# Patient Record
Sex: MALE | Race: WHITE | NOT HISPANIC OR LATINO | Employment: OTHER | URBAN - METROPOLITAN AREA
[De-identification: names, ages, dates, MRNs, and addresses within clinical notes are randomized per-mention and may not be internally consistent; named-entity substitution may affect disease eponyms.]

---

## 2023-01-17 PROBLEM — M17.9 OSTEOARTHRITIS OF KNEE: Status: ACTIVE | Noted: 2023-01-17

## 2023-01-17 PROBLEM — R10.2 SUPRAPUBIC PAIN: Status: ACTIVE | Noted: 2023-01-17

## 2023-01-17 PROBLEM — H69.90 EUSTACHIAN TUBE DISORDER: Status: ACTIVE | Noted: 2023-01-17

## 2023-01-17 PROBLEM — G25.0 ESSENTIAL TREMOR: Status: ACTIVE | Noted: 2023-01-17

## 2023-01-17 PROBLEM — I43 CARDIAC AMYLOIDOSIS (MULTI): Status: ACTIVE | Noted: 2023-01-17

## 2023-01-17 PROBLEM — M62.830 LUMBAR PARASPINAL MUSCLE SPASM: Status: ACTIVE | Noted: 2023-01-17

## 2023-01-17 PROBLEM — C44.01 BASAL CELL CARCINOMA OF UPPER LIP: Status: ACTIVE | Noted: 2023-01-17

## 2023-01-17 PROBLEM — Z12.11 COLON CANCER SCREENING: Status: RESOLVED | Noted: 2023-01-17 | Resolved: 2023-01-17

## 2023-01-17 PROBLEM — Z12.11 COLON CANCER SCREENING: Status: ACTIVE | Noted: 2023-01-17

## 2023-01-17 PROBLEM — R97.20 BPH WITH ELEVATED PSA: Status: ACTIVE | Noted: 2023-01-17

## 2023-01-17 PROBLEM — K59.00 CONSTIPATION: Status: ACTIVE | Noted: 2023-01-17

## 2023-01-17 PROBLEM — R94.4 DECREASED GFR: Status: ACTIVE | Noted: 2023-01-17

## 2023-01-17 PROBLEM — I48.92 ATRIAL FIBRILLATION AND FLUTTER (MULTI): Status: ACTIVE | Noted: 2023-01-17

## 2023-01-17 PROBLEM — N40.1 BENIGN LOCALIZED PROSTATIC HYPERPLASIA WITH LOWER URINARY TRACT SYMPTOMS (LUTS): Status: ACTIVE | Noted: 2023-01-17

## 2023-01-17 PROBLEM — E78.5 HYPERLIPIDEMIA: Status: ACTIVE | Noted: 2023-01-17

## 2023-01-17 PROBLEM — G20.C PARKINSONISM (MULTI): Status: ACTIVE | Noted: 2023-01-17

## 2023-01-17 PROBLEM — K21.9 LARYNGOPHARYNGEAL REFLUX (LPR): Status: ACTIVE | Noted: 2023-01-17

## 2023-01-17 PROBLEM — I48.91 ATRIAL FIBRILLATION AND FLUTTER (MULTI): Status: ACTIVE | Noted: 2023-01-17

## 2023-01-17 PROBLEM — R63.4 WEIGHT LOSS: Status: ACTIVE | Noted: 2023-01-17

## 2023-01-17 PROBLEM — R06.09 DYSPNEA ON EXERTION: Status: ACTIVE | Noted: 2023-01-17

## 2023-01-17 PROBLEM — I42.9 CARDIOMYOPATHY (MULTI): Status: ACTIVE | Noted: 2023-01-17

## 2023-01-17 PROBLEM — I50.30 DIASTOLIC HEART FAILURE, STAGE C (MULTI): Status: ACTIVE | Noted: 2023-01-17

## 2023-01-17 PROBLEM — Z13.31 DEPRESSION SCREENING: Status: RESOLVED | Noted: 2023-01-17 | Resolved: 2023-01-17

## 2023-01-17 PROBLEM — H91.90 DECREASED HEARING: Status: ACTIVE | Noted: 2023-01-17

## 2023-01-17 PROBLEM — I95.9 LOW BP: Status: ACTIVE | Noted: 2023-01-17

## 2023-01-17 PROBLEM — R49.0 HOARSENESS: Status: ACTIVE | Noted: 2023-01-17

## 2023-01-17 PROBLEM — J31.0 CHRONIC RHINITIS: Status: ACTIVE | Noted: 2023-01-17

## 2023-01-17 PROBLEM — R93.2 ABNORMAL LIVER SCAN: Status: ACTIVE | Noted: 2023-01-17

## 2023-01-17 PROBLEM — D64.9 NORMOCYTIC ANEMIA: Status: ACTIVE | Noted: 2023-01-17

## 2023-01-17 PROBLEM — E03.9 HYPOTHYROIDISM: Status: ACTIVE | Noted: 2023-01-17

## 2023-01-17 PROBLEM — R79.89 ABNORMAL TSH: Status: ACTIVE | Noted: 2023-01-17

## 2023-01-17 PROBLEM — E85.4 CARDIAC AMYLOIDOSIS (MULTI): Status: ACTIVE | Noted: 2023-01-17

## 2023-01-17 PROBLEM — Z13.31 DEPRESSION SCREENING: Status: ACTIVE | Noted: 2023-01-17

## 2023-01-17 PROBLEM — N40.0 BPH WITH ELEVATED PSA: Status: ACTIVE | Noted: 2023-01-17

## 2023-01-17 PROBLEM — K76.9 CHRONIC LIVER DISEASE: Status: ACTIVE | Noted: 2023-01-17

## 2023-01-17 RX ORDER — PROPRANOLOL HYDROCHLORIDE 20 MG/1
1.5 TABLET ORAL 2 TIMES DAILY
COMMUNITY

## 2023-01-17 RX ORDER — SPIRONOLACTONE 25 MG/1
25 TABLET ORAL DAILY
COMMUNITY
End: 2023-04-04 | Stop reason: SDUPTHER

## 2023-01-17 RX ORDER — FUROSEMIDE 20 MG/1
20 TABLET ORAL DAILY
COMMUNITY
End: 2023-06-22 | Stop reason: SDUPTHER

## 2023-01-17 RX ORDER — EPINEPHRINE 0.22MG
100 AEROSOL WITH ADAPTER (ML) INHALATION SEE ADMIN INSTRUCTIONS
COMMUNITY
End: 2023-11-08 | Stop reason: WASHOUT

## 2023-01-17 RX ORDER — ATORVASTATIN CALCIUM 20 MG/1
20 TABLET, FILM COATED ORAL DAILY
COMMUNITY
End: 2023-04-04 | Stop reason: SDUPTHER

## 2023-01-17 RX ORDER — LEVOTHYROXINE SODIUM 25 UG/1
25 CAPSULE ORAL
COMMUNITY
End: 2023-03-06

## 2023-01-17 RX ORDER — SAW PALMETTO 160 MG
160 CAPSULE ORAL 2 TIMES DAILY
COMMUNITY
End: 2023-11-08 | Stop reason: WASHOUT

## 2023-01-17 RX ORDER — MULTIVITAMIN
1 TABLET ORAL DAILY
COMMUNITY
End: 2023-11-08 | Stop reason: WASHOUT

## 2023-01-17 RX ORDER — LEVOTHYROXINE SODIUM 13 UG/1
13 CAPSULE ORAL
COMMUNITY
End: 2023-03-06

## 2023-03-02 LAB
ALANINE AMINOTRANSFERASE (SGPT) (U/L) IN SER/PLAS: 12 U/L (ref 10–52)
ALBUMIN (G/DL) IN SER/PLAS: 4.4 G/DL (ref 3.4–5)
ALKALINE PHOSPHATASE (U/L) IN SER/PLAS: 108 U/L (ref 33–136)
ANION GAP IN SER/PLAS: 14 MMOL/L (ref 10–20)
ASPARTATE AMINOTRANSFERASE (SGOT) (U/L) IN SER/PLAS: 23 U/L (ref 9–39)
BASOPHILS (10*3/UL) IN BLOOD BY AUTOMATED COUNT: 0.07 X10E9/L (ref 0–0.1)
BASOPHILS/100 LEUKOCYTES IN BLOOD BY AUTOMATED COUNT: 1.2 % (ref 0–2)
BILIRUBIN TOTAL (MG/DL) IN SER/PLAS: 0.9 MG/DL (ref 0–1.2)
CALCIUM (MG/DL) IN SER/PLAS: 10.1 MG/DL (ref 8.6–10.6)
CARBON DIOXIDE, TOTAL (MMOL/L) IN SER/PLAS: 28 MMOL/L (ref 21–32)
CHLORIDE (MMOL/L) IN SER/PLAS: 102 MMOL/L (ref 98–107)
CHOLESTEROL (MG/DL) IN SER/PLAS: 107 MG/DL (ref 0–199)
CHOLESTEROL IN HDL (MG/DL) IN SER/PLAS: 49.7 MG/DL
CHOLESTEROL/HDL RATIO: 2.2
CREATININE (MG/DL) IN SER/PLAS: 1.32 MG/DL (ref 0.5–1.3)
EOSINOPHILS (10*3/UL) IN BLOOD BY AUTOMATED COUNT: 0.09 X10E9/L (ref 0–0.4)
EOSINOPHILS/100 LEUKOCYTES IN BLOOD BY AUTOMATED COUNT: 1.6 % (ref 0–6)
ERYTHROCYTE DISTRIBUTION WIDTH (RATIO) BY AUTOMATED COUNT: 15.3 % (ref 11.5–14.5)
ERYTHROCYTE MEAN CORPUSCULAR HEMOGLOBIN CONCENTRATION (G/DL) BY AUTOMATED: 30.4 G/DL (ref 32–36)
ERYTHROCYTE MEAN CORPUSCULAR VOLUME (FL) BY AUTOMATED COUNT: 89 FL (ref 80–100)
ERYTHROCYTES (10*6/UL) IN BLOOD BY AUTOMATED COUNT: 4.52 X10E12/L (ref 4.5–5.9)
GFR MALE: 54 ML/MIN/1.73M2
GLUCOSE (MG/DL) IN SER/PLAS: 88 MG/DL (ref 74–99)
HEMATOCRIT (%) IN BLOOD BY AUTOMATED COUNT: 40.4 % (ref 41–52)
HEMOGLOBIN (G/DL) IN BLOOD: 12.3 G/DL (ref 13.5–17.5)
IMMATURE GRANULOCYTES/100 LEUKOCYTES IN BLOOD BY AUTOMATED COUNT: 0.2 % (ref 0–0.9)
LDL: 39 MG/DL (ref 0–99)
LEUKOCYTES (10*3/UL) IN BLOOD BY AUTOMATED COUNT: 5.8 X10E9/L (ref 4.4–11.3)
LYMPHOCYTES (10*3/UL) IN BLOOD BY AUTOMATED COUNT: 1.6 X10E9/L (ref 0.8–3)
LYMPHOCYTES/100 LEUKOCYTES IN BLOOD BY AUTOMATED COUNT: 27.7 % (ref 13–44)
MONOCYTES (10*3/UL) IN BLOOD BY AUTOMATED COUNT: 0.61 X10E9/L (ref 0.05–0.8)
MONOCYTES/100 LEUKOCYTES IN BLOOD BY AUTOMATED COUNT: 10.6 % (ref 2–10)
NEUTROPHILS (10*3/UL) IN BLOOD BY AUTOMATED COUNT: 3.39 X10E9/L (ref 1.6–5.5)
NEUTROPHILS/100 LEUKOCYTES IN BLOOD BY AUTOMATED COUNT: 58.7 % (ref 40–80)
NRBC (PER 100 WBCS) BY AUTOMATED COUNT: 0 /100 WBC (ref 0–0)
PLATELETS (10*3/UL) IN BLOOD AUTOMATED COUNT: 310 X10E9/L (ref 150–450)
POTASSIUM (MMOL/L) IN SER/PLAS: 5 MMOL/L (ref 3.5–5.3)
PROTEIN TOTAL: 7.1 G/DL (ref 6.4–8.2)
SODIUM (MMOL/L) IN SER/PLAS: 139 MMOL/L (ref 136–145)
THYROTROPIN (MIU/L) IN SER/PLAS BY DETECTION LIMIT <= 0.05 MIU/L: 3.44 MIU/L (ref 0.44–3.98)
TRIGLYCERIDE (MG/DL) IN SER/PLAS: 94 MG/DL (ref 0–149)
UREA NITROGEN (MG/DL) IN SER/PLAS: 33 MG/DL (ref 6–23)
VLDL: 19 MG/DL (ref 0–40)

## 2023-03-06 ENCOUNTER — OFFICE VISIT (OUTPATIENT)
Dept: PRIMARY CARE | Facility: CLINIC | Age: 83
End: 2023-03-06
Payer: MEDICARE

## 2023-03-06 VITALS
RESPIRATION RATE: 16 BRPM | TEMPERATURE: 98 F | WEIGHT: 162.2 LBS | HEART RATE: 87 BPM | OXYGEN SATURATION: 97 % | SYSTOLIC BLOOD PRESSURE: 130 MMHG | HEIGHT: 65 IN | BODY MASS INDEX: 27.02 KG/M2 | DIASTOLIC BLOOD PRESSURE: 82 MMHG

## 2023-03-06 DIAGNOSIS — C44.92 SCCA (SQUAMOUS CELL CARCINOMA) OF SKIN: Primary | ICD-10-CM

## 2023-03-06 DIAGNOSIS — G25.0 ESSENTIAL TREMOR: ICD-10-CM

## 2023-03-06 DIAGNOSIS — E03.9 HYPOTHYROIDISM, UNSPECIFIED TYPE: ICD-10-CM

## 2023-03-06 DIAGNOSIS — N18.30 CHRONIC KIDNEY DISEASE (CKD), ACTIVE MEDICAL MANAGEMENT WITHOUT DIALYSIS, STAGE 3 (MODERATE) (MULTI): ICD-10-CM

## 2023-03-06 PROCEDURE — 1159F MED LIST DOCD IN RCRD: CPT | Performed by: FAMILY MEDICINE

## 2023-03-06 PROCEDURE — 99214 OFFICE O/P EST MOD 30 MIN: CPT | Performed by: FAMILY MEDICINE

## 2023-03-06 RX ORDER — LEVOTHYROXINE SODIUM 50 UG/1
50 TABLET ORAL DAILY
Qty: 90 TABLET | Refills: 3 | Status: SHIPPED | OUTPATIENT
Start: 2023-03-06 | End: 2024-03-14

## 2023-03-06 NOTE — PROGRESS NOTES
"Subjective   Archie Pedersen is a 82 y.o. male who presents for Follow-up (Six month follow up visit ).  HPI      Objective   /82   Pulse 87   Temp 36.7 °C (98 °F)   Resp 16   Ht 1.651 m (5' 5\")   Wt 73.6 kg (162 lb 3.2 oz)   SpO2 97%   BMI 26.99 kg/m²    Physical Exam  Vitals reviewed.   Constitutional:       General: He is not in acute distress.     Appearance: He is not toxic-appearing.   HENT:      Head: Normocephalic and atraumatic.      Mouth/Throat:      Mouth: Mucous membranes are moist.   Eyes:      Extraocular Movements: Extraocular movements intact.      Pupils: Pupils are equal, round, and reactive to light.   Cardiovascular:      Rate and Rhythm: Normal rate and regular rhythm.   Pulmonary:      Effort: Pulmonary effort is normal. No respiratory distress.      Breath sounds: No wheezing, rhonchi or rales.   Abdominal:      General: Bowel sounds are normal. There is no distension.      Tenderness: There is no abdominal tenderness. There is no guarding or rebound.   Musculoskeletal:         General: No swelling or deformity.   Lymphadenopathy:      Cervical: No cervical adenopathy.   Skin:     General: Skin is warm and dry.   Neurological:      General: No focal deficit present.      Mental Status: He is alert and oriented to person, place, and time.   Psychiatric:         Mood and Affect: Mood normal.         Thought Content: Thought content normal.         Judgment: Judgment normal.     Intention tremor    Assessment/Plan   Problem List Items Addressed This Visit          Nervous    Essential tremor       Endocrine/Metabolic    Hypothyroidism    Relevant Medications    levothyroxine (Synthroid, Levoxyl) 50 mcg tablet    Other Relevant Orders    TSH with reflex to Free T4 if abnormal     Other Visit Diagnoses       SCCA (squamous cell carcinoma) of skin    -  Primary    Chronic kidney disease (CKD), active medical management without dialysis, stage 3 (moderate)              Reviewed recent " labs  ET: intol of higher dose propranolol   Levoth too $$ at current dose will inc to 50 mcg as TSH is on the high side of nml should be able to tolerate the change. Recheck labs 2 mo   Derm appt for scc  CKD stable monitor q 6 mo no nephro  Follow up 4 mo AWV

## 2023-04-04 ENCOUNTER — TELEPHONE (OUTPATIENT)
Dept: PRIMARY CARE | Facility: CLINIC | Age: 83
End: 2023-04-04
Payer: MEDICARE

## 2023-04-04 DIAGNOSIS — E78.2 MIXED HYPERLIPIDEMIA: Primary | ICD-10-CM

## 2023-04-04 DIAGNOSIS — I48.91 ATRIAL FIBRILLATION, UNSPECIFIED TYPE (MULTI): ICD-10-CM

## 2023-04-04 DIAGNOSIS — I10 HYPERTENSION, UNSPECIFIED TYPE: ICD-10-CM

## 2023-04-04 RX ORDER — SPIRONOLACTONE 25 MG/1
25 TABLET ORAL DAILY
Qty: 90 TABLET | Refills: 3 | Status: SHIPPED | OUTPATIENT
Start: 2023-04-04 | End: 2024-03-14

## 2023-04-04 RX ORDER — ATORVASTATIN CALCIUM 20 MG/1
20 TABLET, FILM COATED ORAL DAILY
Qty: 30 TABLET | Refills: 3 | Status: SHIPPED | OUTPATIENT
Start: 2023-04-04 | End: 2023-04-13 | Stop reason: SDUPTHER

## 2023-04-13 DIAGNOSIS — E78.2 MIXED HYPERLIPIDEMIA: ICD-10-CM

## 2023-04-13 RX ORDER — ATORVASTATIN CALCIUM 20 MG/1
20 TABLET, FILM COATED ORAL DAILY
Qty: 90 TABLET | Refills: 3 | Status: SHIPPED | OUTPATIENT
Start: 2023-04-13 | End: 2024-06-03

## 2023-05-01 ENCOUNTER — LAB (OUTPATIENT)
Dept: LAB | Facility: LAB | Age: 83
End: 2023-05-01
Payer: MEDICARE

## 2023-05-01 DIAGNOSIS — E03.9 HYPOTHYROIDISM, UNSPECIFIED TYPE: ICD-10-CM

## 2023-05-01 LAB — THYROTROPIN (MIU/L) IN SER/PLAS BY DETECTION LIMIT <= 0.05 MIU/L: 3.22 MIU/L (ref 0.44–3.98)

## 2023-05-01 PROCEDURE — 36415 COLL VENOUS BLD VENIPUNCTURE: CPT

## 2023-05-01 PROCEDURE — 84443 ASSAY THYROID STIM HORMONE: CPT

## 2023-05-17 ENCOUNTER — APPOINTMENT (OUTPATIENT)
Dept: LAB | Facility: LAB | Age: 83
End: 2023-05-17
Payer: MEDICARE

## 2023-05-17 ENCOUNTER — NURSE ONLY (OUTPATIENT)
Dept: RESEARCH | Age: 83
End: 2023-05-17

## 2023-06-22 DIAGNOSIS — I50.30 DIASTOLIC HEART FAILURE, STAGE C (MULTI): Primary | ICD-10-CM

## 2023-06-22 RX ORDER — FUROSEMIDE 20 MG/1
20 TABLET ORAL DAILY
Qty: 90 TABLET | Refills: 3 | Status: SHIPPED | OUTPATIENT
Start: 2023-06-22 | End: 2024-05-22 | Stop reason: SDUPTHER

## 2023-06-26 ENCOUNTER — TELEPHONE (OUTPATIENT)
Dept: PRIMARY CARE | Facility: CLINIC | Age: 83
End: 2023-06-26
Payer: MEDICARE

## 2023-06-30 LAB
ALANINE AMINOTRANSFERASE (SGPT) (U/L) IN SER/PLAS: 12 U/L (ref 10–52)
ALBUMIN (G/DL) IN SER/PLAS: 4.3 G/DL (ref 3.4–5)
ALKALINE PHOSPHATASE (U/L) IN SER/PLAS: 119 U/L (ref 33–136)
ANION GAP IN SER/PLAS: 15 MMOL/L (ref 10–20)
APPEARANCE, URINE: NORMAL
ASPARTATE AMINOTRANSFERASE (SGOT) (U/L) IN SER/PLAS: 26 U/L (ref 9–39)
BASOPHILS (10*3/UL) IN BLOOD BY AUTOMATED COUNT: 0.1 X10E9/L (ref 0–0.1)
BASOPHILS/100 LEUKOCYTES IN BLOOD BY AUTOMATED COUNT: 1.3 % (ref 0–2)
BILIRUBIN TOTAL (MG/DL) IN SER/PLAS: 1.1 MG/DL (ref 0–1.2)
BILIRUBIN, URINE: NEGATIVE
BLOOD, URINE: NEGATIVE
CALCIUM (MG/DL) IN SER/PLAS: 10.5 MG/DL (ref 8.6–10.6)
CARBON DIOXIDE, TOTAL (MMOL/L) IN SER/PLAS: 31 MMOL/L (ref 21–32)
CHLORIDE (MMOL/L) IN SER/PLAS: 99 MMOL/L (ref 98–107)
COLOR, URINE: YELLOW
CREATININE (MG/DL) IN SER/PLAS: 1.33 MG/DL (ref 0.5–1.3)
EOSINOPHILS (10*3/UL) IN BLOOD BY AUTOMATED COUNT: 0.41 X10E9/L (ref 0–0.4)
EOSINOPHILS/100 LEUKOCYTES IN BLOOD BY AUTOMATED COUNT: 5.3 % (ref 0–6)
ERYTHROCYTE DISTRIBUTION WIDTH (RATIO) BY AUTOMATED COUNT: 19.4 % (ref 11.5–14.5)
ERYTHROCYTE MEAN CORPUSCULAR HEMOGLOBIN CONCENTRATION (G/DL) BY AUTOMATED: 30.7 G/DL (ref 32–36)
ERYTHROCYTE MEAN CORPUSCULAR VOLUME (FL) BY AUTOMATED COUNT: 89 FL (ref 80–100)
ERYTHROCYTES (10*6/UL) IN BLOOD BY AUTOMATED COUNT: 5.15 X10E12/L (ref 4.5–5.9)
GFR MALE: 53 ML/MIN/1.73M2
GLUCOSE (MG/DL) IN SER/PLAS: 89 MG/DL (ref 74–99)
GLUCOSE, URINE: NEGATIVE MG/DL
HEMATOCRIT (%) IN BLOOD BY AUTOMATED COUNT: 45.9 % (ref 41–52)
HEMOGLOBIN (G/DL) IN BLOOD: 14.1 G/DL (ref 13.5–17.5)
IMMATURE GRANULOCYTES/100 LEUKOCYTES IN BLOOD BY AUTOMATED COUNT: 0.3 % (ref 0–0.9)
KETONES, URINE: NEGATIVE MG/DL
LEUKOCYTE ESTERASE, URINE: NEGATIVE
LEUKOCYTES (10*3/UL) IN BLOOD BY AUTOMATED COUNT: 7.7 X10E9/L (ref 4.4–11.3)
LYMPHOCYTES (10*3/UL) IN BLOOD BY AUTOMATED COUNT: 1.24 X10E9/L (ref 0.8–3)
LYMPHOCYTES/100 LEUKOCYTES IN BLOOD BY AUTOMATED COUNT: 16 % (ref 13–44)
MONOCYTES (10*3/UL) IN BLOOD BY AUTOMATED COUNT: 0.87 X10E9/L (ref 0.05–0.8)
MONOCYTES/100 LEUKOCYTES IN BLOOD BY AUTOMATED COUNT: 11.2 % (ref 2–10)
NEUTROPHILS (10*3/UL) IN BLOOD BY AUTOMATED COUNT: 5.1 X10E9/L (ref 1.6–5.5)
NEUTROPHILS/100 LEUKOCYTES IN BLOOD BY AUTOMATED COUNT: 65.9 % (ref 40–80)
NITRITE, URINE: NEGATIVE
NRBC (PER 100 WBCS) BY AUTOMATED COUNT: 0 /100 WBC (ref 0–0)
PH, URINE: 5 (ref 5–8)
PLATELETS (10*3/UL) IN BLOOD AUTOMATED COUNT: 290 X10E9/L (ref 150–450)
POTASSIUM (MMOL/L) IN SER/PLAS: 4.6 MMOL/L (ref 3.5–5.3)
PROTEIN TOTAL: 7.5 G/DL (ref 6.4–8.2)
PROTEIN, URINE: NEGATIVE MG/DL
SODIUM (MMOL/L) IN SER/PLAS: 140 MMOL/L (ref 136–145)
SPECIFIC GRAVITY, URINE: 1.02 (ref 1–1.03)
UREA NITROGEN (MG/DL) IN SER/PLAS: 30 MG/DL (ref 6–23)
UROBILINOGEN, URINE: <2 MG/DL (ref 0–1.9)

## 2023-07-05 ENCOUNTER — NURSE ONLY (OUTPATIENT)
Dept: RESEARCH | Age: 83
End: 2023-07-05

## 2023-07-07 ENCOUNTER — OFFICE VISIT (OUTPATIENT)
Dept: PRIMARY CARE | Facility: CLINIC | Age: 83
End: 2023-07-07
Payer: MEDICARE

## 2023-07-07 VITALS
HEIGHT: 66 IN | SYSTOLIC BLOOD PRESSURE: 125 MMHG | HEART RATE: 78 BPM | RESPIRATION RATE: 18 BRPM | OXYGEN SATURATION: 97 % | WEIGHT: 162.4 LBS | TEMPERATURE: 98 F | BODY MASS INDEX: 26.1 KG/M2 | DIASTOLIC BLOOD PRESSURE: 76 MMHG

## 2023-07-07 DIAGNOSIS — I50.32 CHRONIC DIASTOLIC HEART FAILURE (MULTI): ICD-10-CM

## 2023-07-07 DIAGNOSIS — E85.4 CARDIAC AMYLOIDOSIS (MULTI): ICD-10-CM

## 2023-07-07 DIAGNOSIS — G20.C PARKINSONISM, UNSPECIFIED PARKINSONISM TYPE (MULTI): ICD-10-CM

## 2023-07-07 DIAGNOSIS — I43 CARDIAC AMYLOIDOSIS (MULTI): ICD-10-CM

## 2023-07-07 DIAGNOSIS — I48.0 PAROXYSMAL ATRIAL FIBRILLATION (MULTI): ICD-10-CM

## 2023-07-07 DIAGNOSIS — N40.1 BENIGN LOCALIZED PROSTATIC HYPERPLASIA WITH LOWER URINARY TRACT SYMPTOMS (LUTS): ICD-10-CM

## 2023-07-07 DIAGNOSIS — E03.8 OTHER SPECIFIED HYPOTHYROIDISM: ICD-10-CM

## 2023-07-07 DIAGNOSIS — Z00.00 ENCOUNTER FOR ANNUAL WELLNESS EXAM IN MEDICARE PATIENT: Primary | ICD-10-CM

## 2023-07-07 DIAGNOSIS — E78.2 MIXED HYPERLIPIDEMIA: ICD-10-CM

## 2023-07-07 PROCEDURE — 1170F FXNL STATUS ASSESSED: CPT | Performed by: FAMILY MEDICINE

## 2023-07-07 PROCEDURE — 1159F MED LIST DOCD IN RCRD: CPT | Performed by: FAMILY MEDICINE

## 2023-07-07 PROCEDURE — 1126F AMNT PAIN NOTED NONE PRSNT: CPT | Performed by: FAMILY MEDICINE

## 2023-07-07 PROCEDURE — G0439 PPPS, SUBSEQ VISIT: HCPCS | Performed by: FAMILY MEDICINE

## 2023-07-07 PROCEDURE — 1036F TOBACCO NON-USER: CPT | Performed by: FAMILY MEDICINE

## 2023-07-07 RX ORDER — METOPROLOL TARTRATE 25 MG/1
25 TABLET, FILM COATED ORAL DAILY
COMMUNITY
Start: 2022-08-02 | End: 2023-07-07 | Stop reason: ALTCHOICE

## 2023-07-07 RX ORDER — TORSEMIDE 20 MG/1
10 TABLET ORAL DAILY
COMMUNITY
Start: 2023-05-02 | End: 2023-07-07 | Stop reason: ALTCHOICE

## 2023-07-07 RX ORDER — PUMPKIN SEED OIL/SAW PALMETTO 160 MG
1 CAPSULE ORAL DAILY
COMMUNITY
Start: 2020-12-28 | End: 2023-11-08 | Stop reason: WASHOUT

## 2023-07-07 RX ORDER — ACETAMINOPHEN 500 MG
1 TABLET ORAL DAILY
COMMUNITY
End: 2023-11-08 | Stop reason: WASHOUT

## 2023-07-07 RX ORDER — POLYETHYLENE GLYCOL 3350 17 G/17G
17 POWDER, FOR SOLUTION ORAL
COMMUNITY
Start: 2022-01-07 | End: 2023-11-08 | Stop reason: WASHOUT

## 2023-07-07 ASSESSMENT — ACTIVITIES OF DAILY LIVING (ADL)
MANAGING_FINANCES: INDEPENDENT
BATHING: INDEPENDENT
TAKING_MEDICATION: INDEPENDENT
GROCERY_SHOPPING: INDEPENDENT
DOING_HOUSEWORK: INDEPENDENT
DRESSING: INDEPENDENT

## 2023-07-07 ASSESSMENT — PATIENT HEALTH QUESTIONNAIRE - PHQ9
9. THOUGHTS THAT YOU WOULD BE BETTER OFF DEAD, OR OF HURTING YOURSELF: NOT AT ALL
7. TROUBLE CONCENTRATING ON THINGS, SUCH AS READING THE NEWSPAPER OR WATCHING TELEVISION: NOT AT ALL
1. LITTLE INTEREST OR PLEASURE IN DOING THINGS: NOT AT ALL
SUM OF ALL RESPONSES TO PHQ QUESTIONS 1-9: 6
6. FEELING BAD ABOUT YOURSELF - OR THAT YOU ARE A FAILURE OR HAVE LET YOURSELF OR YOUR FAMILY DOWN: NOT AT ALL
2. FEELING DOWN, DEPRESSED OR HOPELESS: NOT AT ALL
10. IF YOU CHECKED OFF ANY PROBLEMS, HOW DIFFICULT HAVE THESE PROBLEMS MADE IT FOR YOU TO DO YOUR WORK, TAKE CARE OF THINGS AT HOME, OR GET ALONG WITH OTHER PEOPLE: NOT DIFFICULT AT ALL
8. MOVING OR SPEAKING SO SLOWLY THAT OTHER PEOPLE COULD HAVE NOTICED. OR THE OPPOSITE, BEING SO FIGETY OR RESTLESS THAT YOU HAVE BEEN MOVING AROUND A LOT MORE THAN USUAL: NOT AT ALL
5. POOR APPETITE OR OVEREATING: NOT AT ALL
SUM OF ALL RESPONSES TO PHQ9 QUESTIONS 1 AND 2: 0
4. FEELING TIRED OR HAVING LITTLE ENERGY: NEARLY EVERY DAY
3. TROUBLE FALLING OR STAYING ASLEEP OR SLEEPING TOO MUCH: NEARLY EVERY DAY

## 2023-07-07 ASSESSMENT — ENCOUNTER SYMPTOMS
DEPRESSION: 0
OCCASIONAL FEELINGS OF UNSTEADINESS: 1
LOSS OF SENSATION IN FEET: 1

## 2023-07-07 NOTE — PROGRESS NOTES
Subjective   Archie Pedersen is a 83 y.o. male who presents for Medicare Annual Wellness Visit Subsequent (Pt being seen today for Medicare Annual Wellness visit /).  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      pumpkin seed oil/saw palmetto (saw palmetto-pumpkin seed oiL) 160 mg capsule Take 1 capsule by mouth once daily.      torsemide (Demadex) 20 mg tablet Take 0.5 tablets (10 mg) by mouth once daily.      [DISCONTINUED] metoprolol tartrate (Lopressor) 25 mg tablet Take 1 tablet (25 mg) by mouth once daily.      apixaban (Eliquis) 5 mg tablet Take 1 tablet (5 mg) by mouth in the morning and 1 tablet (5 mg) before bedtime. 180 tablet 3    atorvastatin (Lipitor) 20 mg tablet Take 1 tablet (20 mg) by mouth once daily. 90 tablet 3    calcium carbonate-vitamin D3 600 mg-20 mcg (800 unit) tablet Take 1 tablet by mouth once daily.      coenzyme Q-10 (CoQ-10) 100 mg capsule Take 1 capsule (100 mg) by mouth See administration instructions. Take as directed      furosemide (Lasix) 20 mg tablet Take 1 tablet (20 mg) by mouth once daily. 90 tablet 3    krill/om-3/dha/epa/phospho/ast (OMEGA-3 KRILL OIL ORAL) Take 500 mg by mouth 1 (one) time each day. Take 1 capsule daily      levothyroxine (Synthroid, Levoxyl) 50 mcg tablet Take 1 tablet (50 mcg) by mouth once daily. 90 tablet 3    multivitamin tablet Take 1 tablet by mouth 1 (one) time each day.      propranolol (Inderal) 20  "mg tablet Take 1 tablet (20 mg) by mouth in the morning and at bedtime.      saw palmetto 160 mg capsule Take 1 capsule (160 mg) by mouth in the morning and at bedtime.      spironolactone (Aldactone) 25 mg tablet Take 1 tablet (25 mg) by mouth once daily. 90 tablet 3    tafamidis (Vyndamax) 61 mg capsule Take 1 capsule (61 mg) by mouth 1 (one) time each day.      tamsulosin HCl (TAMSULOSIN ORAL) Take 0.4 mg by mouth 1 (one) time each day. Take 1 capsule daily       No current facility-administered medications on file prior to visit.        Patient Health Questionnaire-9 Score: 6           Objective   /76   Pulse 78   Temp 36.7 °C (98 °F)   Resp 18   Ht 1.676 m (5' 5.98\")   Wt 73.7 kg (162 lb 6.4 oz)   SpO2 97%   BMI 26.22 kg/m²    Physical Exam  General: NAD  HEENT:NCAT, PERRLA, nml OP, b/l TM clear   Neck: no cervical CASI  Heart: RRR no murmur, no edema   Lungs: CTA b/l, no wheeze or rhonchi   GI: abd soft, nontender, nondistended.   MSK: no c/c/e. nml gait   Skin: warm and dry  Psych: cooperative, appropriate affect  Neuro: speech clear. A&Ox3  Assessment/Plan   Problem List Items Addressed This Visit          Cardiac and Vasculature    Cardiac amyloidosis (CMS/HCC)  -stable per cards  -started clin trial       Hyperlipidemia  -stable on labs in march  -cont statin  -f/up 6 mo labs prior     Relevant Orders    CBC and Auto Differential    Comprehensive Metabolic Panel    Lipid Panel       Endocrine/Metabolic    Hypothyroidism  -TSH nml in march   -RF levo when needed     Relevant Orders    TSH with reflex to Free T4 if abnormal       Genitourinary and Reproductive    Benign localized prostatic hyperplasia with lower urinary tract symptoms (LUTS)  -check PSA w next set of labs   -cont flomax     Relevant Orders    Prostate Specific Antigen, Screen       Neuro    Parkinsonism (CMS/HCC)  -stable per neuro.  -ET tremor controlled w propranolol      Other Visit Diagnoses       Encounter for annual " wellness exam in Medicare patient    -  Primary  CPE:overall doing well. Cont balanced diet/reg ex  BMI: 26  VACC: reviewed. Due for shingrix rec to get at pharm. PNA UTD   COLON CA SCRN: not indicated d/t age   LABS: UTD rpt 6 mo   Prostate ca scrn: PSA in 6 mo       Chronic diastolic heart failure (CMS/HCC)      -echo stable 11/2022 per cards   -cont lasix, spironolactone, statin. No metoprolol since propranolol helps ET Sx     Paroxysmal atrial fibrillation (CMS/HCC)      -on eliquis

## 2023-07-31 ENCOUNTER — NURSE ONLY (OUTPATIENT)
Dept: RESEARCH | Age: 83
End: 2023-07-31

## 2023-08-10 DIAGNOSIS — N40.1 BENIGN LOCALIZED PROSTATIC HYPERPLASIA WITH LOWER URINARY TRACT SYMPTOMS (LUTS): Primary | ICD-10-CM

## 2023-08-10 RX ORDER — TAMSULOSIN HYDROCHLORIDE 0.4 MG/1
0.4 CAPSULE ORAL DAILY
Qty: 90 CAPSULE | Refills: 3 | Status: SHIPPED | OUTPATIENT
Start: 2023-08-10 | End: 2024-08-09

## 2023-08-15 LAB
COBALAMIN (VITAMIN B12) (PG/ML) IN SER/PLAS: 620 PG/ML (ref 211–911)
ESTIMATED AVERAGE GLUCOSE FOR HBA1C: 117 MG/DL
HEMOGLOBIN A1C/HEMOGLOBIN TOTAL IN BLOOD: 5.7 %

## 2023-08-29 ENCOUNTER — NURSE ONLY (OUTPATIENT)
Dept: RESEARCH | Age: 83
End: 2023-08-29

## 2023-10-06 ENCOUNTER — SPECIALTY PHARMACY (OUTPATIENT)
Dept: PHARMACY | Facility: CLINIC | Age: 83
End: 2023-10-06

## 2023-10-12 ENCOUNTER — SPECIALTY PHARMACY (OUTPATIENT)
Dept: PHARMACY | Facility: CLINIC | Age: 83
End: 2023-10-12

## 2023-10-12 ENCOUNTER — PHARMACY VISIT (OUTPATIENT)
Dept: PHARMACY | Facility: CLINIC | Age: 83
End: 2023-10-12
Payer: MEDICARE

## 2023-10-12 PROCEDURE — RXMED WILLOW AMBULATORY MEDICATION CHARGE

## 2023-10-17 ENCOUNTER — SPECIALTY PHARMACY (OUTPATIENT)
Dept: PHARMACY | Facility: CLINIC | Age: 83
End: 2023-10-17

## 2023-10-17 ENCOUNTER — APPOINTMENT (OUTPATIENT)
Dept: PRIMARY CARE | Facility: CLINIC | Age: 83
End: 2023-10-17
Payer: MEDICARE

## 2023-10-18 RX ORDER — CHOLECALCIFEROL (VITAMIN D3) 50 MCG
1 TABLET ORAL DAILY
COMMUNITY
Start: 2023-09-20 | End: 2023-11-08 | Stop reason: WASHOUT

## 2023-10-19 ENCOUNTER — OFFICE VISIT (OUTPATIENT)
Dept: PRIMARY CARE | Facility: CLINIC | Age: 83
End: 2023-10-19
Payer: MEDICARE

## 2023-10-19 DIAGNOSIS — Z00.6 RESEARCH STUDY PATIENT: ICD-10-CM

## 2023-10-19 DIAGNOSIS — Z23 FLU VACCINE NEED: ICD-10-CM

## 2023-10-19 DIAGNOSIS — E85.82 WILD-TYPE TRANSTHYRETIN-RELATED (ATTR) AMYLOIDOSIS (MULTI): ICD-10-CM

## 2023-10-19 PROCEDURE — 90662 IIV NO PRSV INCREASED AG IM: CPT | Performed by: INTERNAL MEDICINE

## 2023-10-19 PROCEDURE — 1126F AMNT PAIN NOTED NONE PRSNT: CPT | Performed by: INTERNAL MEDICINE

## 2023-10-19 PROCEDURE — G0008 ADMIN INFLUENZA VIRUS VAC: HCPCS | Performed by: INTERNAL MEDICINE

## 2023-10-19 PROCEDURE — 1159F MED LIST DOCD IN RCRD: CPT | Performed by: INTERNAL MEDICINE

## 2023-10-19 PROCEDURE — 1036F TOBACCO NON-USER: CPT | Performed by: INTERNAL MEDICINE

## 2023-10-23 NOTE — RESEARCH NOTES
Placing refill order for CardioTransform week 21. Patient will come to [place] [facility] on Friday 10/27 at 4pm.

## 2023-10-25 ENCOUNTER — SPECIALTY PHARMACY (OUTPATIENT)
Dept: PHARMACY | Facility: CLINIC | Age: 83
End: 2023-10-25

## 2023-10-27 ENCOUNTER — NURSE ONLY (OUTPATIENT)
Dept: RESEARCH | Age: 83
End: 2023-10-27

## 2023-10-27 ENCOUNTER — NURSE ONLY (OUTPATIENT)
Dept: CARDIOLOGY | Facility: HOSPITAL | Age: 83
End: 2023-10-27
Payer: MEDICARE

## 2023-10-27 VITALS
HEART RATE: 60 BPM | DIASTOLIC BLOOD PRESSURE: 75 MMHG | BODY MASS INDEX: 25.97 KG/M2 | RESPIRATION RATE: 24 BRPM | SYSTOLIC BLOOD PRESSURE: 126 MMHG | WEIGHT: 160.8 LBS

## 2023-11-01 ENCOUNTER — HOSPITAL ENCOUNTER (OUTPATIENT)
Dept: CARDIOLOGY | Facility: CLINIC | Age: 83
Discharge: HOME | End: 2023-11-01
Payer: MEDICARE

## 2023-11-01 DIAGNOSIS — E85.4 ORGAN-LIMITED AMYLOIDOSIS (MULTI): ICD-10-CM

## 2023-11-01 DIAGNOSIS — I42.9 CARDIOMYOPATHY, UNSPECIFIED (MULTI): ICD-10-CM

## 2023-11-01 LAB
EJECTION FRACTION APICAL 4 CHAMBER: 68
GLOBAL LONGITUDINAL STRAIN: -13.7

## 2023-11-01 PROCEDURE — 93356 MYOCRD STRAIN IMG SPCKL TRCK: CPT | Performed by: INTERNAL MEDICINE

## 2023-11-01 PROCEDURE — 93306 TTE W/DOPPLER COMPLETE: CPT

## 2023-11-01 PROCEDURE — 93306 TTE W/DOPPLER COMPLETE: CPT | Performed by: INTERNAL MEDICINE

## 2023-11-08 ENCOUNTER — OFFICE VISIT (OUTPATIENT)
Dept: CARDIOLOGY | Facility: CLINIC | Age: 83
End: 2023-11-08
Payer: MEDICARE

## 2023-11-08 VITALS
WEIGHT: 162.5 LBS | DIASTOLIC BLOOD PRESSURE: 63 MMHG | OXYGEN SATURATION: 98 % | SYSTOLIC BLOOD PRESSURE: 109 MMHG | HEIGHT: 66 IN | BODY MASS INDEX: 26.12 KG/M2 | HEART RATE: 78 BPM

## 2023-11-08 DIAGNOSIS — I48.91 ATRIAL FIBRILLATION AND FLUTTER (MULTI): Primary | ICD-10-CM

## 2023-11-08 DIAGNOSIS — E85.4 CARDIAC AMYLOIDOSIS (MULTI): ICD-10-CM

## 2023-11-08 DIAGNOSIS — I48.92 ATRIAL FIBRILLATION AND FLUTTER (MULTI): Primary | ICD-10-CM

## 2023-11-08 DIAGNOSIS — I43 CARDIAC AMYLOIDOSIS (MULTI): ICD-10-CM

## 2023-11-08 DIAGNOSIS — E78.2 MIXED HYPERLIPIDEMIA: ICD-10-CM

## 2023-11-08 DIAGNOSIS — I42.9 CARDIOMYOPATHY, UNSPECIFIED TYPE (MULTI): ICD-10-CM

## 2023-11-08 PROBLEM — L81.4 OTHER MELANIN HYPERPIGMENTATION: Status: ACTIVE | Noted: 2023-04-06

## 2023-11-08 PROBLEM — Z85.828 PERSONAL HISTORY OF OTHER MALIGNANT NEOPLASM OF SKIN: Status: ACTIVE | Noted: 2023-04-06

## 2023-11-08 PROBLEM — L82.1 OTHER SEBORRHEIC KERATOSIS: Status: ACTIVE | Noted: 2023-04-06

## 2023-11-08 PROBLEM — G57.93 NEUROPATHY OF BOTH FEET: Status: ACTIVE | Noted: 2023-11-08

## 2023-11-08 PROCEDURE — 1159F MED LIST DOCD IN RCRD: CPT | Performed by: NURSE PRACTITIONER

## 2023-11-08 PROCEDURE — 1160F RVW MEDS BY RX/DR IN RCRD: CPT | Performed by: NURSE PRACTITIONER

## 2023-11-08 PROCEDURE — 99214 OFFICE O/P EST MOD 30 MIN: CPT | Performed by: NURSE PRACTITIONER

## 2023-11-08 PROCEDURE — 1126F AMNT PAIN NOTED NONE PRSNT: CPT | Performed by: NURSE PRACTITIONER

## 2023-11-08 PROCEDURE — 1036F TOBACCO NON-USER: CPT | Performed by: NURSE PRACTITIONER

## 2023-11-08 ASSESSMENT — PAIN SCALES - GENERAL: PAINLEVEL: 0-NO PAIN

## 2023-11-08 NOTE — PROGRESS NOTES
Subjective   Archie Pedersen is a 83 y.o. male.    Chief Complaint:  Atrial Fibrillation, Hyperlipidemia, and Cardiomyopathy    Mr. Pedersen returns for a 6 month follow up. He has been feeling well from a cardiac standpoint. He has remained compliant with his medications, denying any intolerances. He remains active with house and yard work, denying any exertional chest pain or shortness of breath. He tires easily, though tries to pace himself. He is on a study drug for amyloidosis, and feels this is contributing to his fatigue. He denies any recent ER visits or hospitalizations. He offers no other complaints or concerns today. He denies any complaints of chest pain, shortness of breath, lightheadedness, dizziness, palpitations, syncope, orthopnea, paroxysmal nocturnal dyspnea, lower extremity swelling or bleeding concerns.        Review of Systems   All other systems reviewed and are negative.      Objective   Physical Exam  Constitutional:       Appearance: Healthy appearance. In no distress  Pulmonary:      Effort: Pulmonary effort is normal.      Breath sounds: Normal breath sounds.   Cardiovascular:      Normal rate. Irregularly irregular rhythm. Normal S1. Normal S2.       Murmurs: There is no murmur.   Edema:     Peripheral edema absent.   Abdominal:      Palpations: Abdomen is soft.   Musculoskeletal: Normal range of motion.      Cervical back: Normal range of motion. Skin:     General: Skin is warm and dry.   Neurological:      Mental Status: Alert and oriented to person, place and time.       Lab Review:   Lab Results   Component Value Date     06/30/2023    K 4.6 06/30/2023    CL 99 06/30/2023    CO2 31 06/30/2023    BUN 30 (H) 06/30/2023    CREATININE 1.33 (H) 06/30/2023    GLUCOSE 89 06/30/2023    CALCIUM 10.5 06/30/2023     Lab Results   Component Value Date    WBC 7.7 06/30/2023    HGB 14.1 06/30/2023    HCT 45.9 06/30/2023    MCV 89 06/30/2023     06/30/2023     Lab Results   Component  Value Date    CHOL 107 03/02/2023    TRIG 94 03/02/2023    HDL 49.7 03/02/2023       Assessment/Plan   Mr. Pedersen is a pleasant 83 year old  male with a past medical history significant for hyperlipidemia, atrial fibrillation/flutter with unsuccessful cardioversion 12/2020 on Eliquis anticoagulation and restrictive cardiomyopathy with wt-ATTR cardiac amyloidosis followed closely by the advanced HF team. Echocardiogram 9/2023 showed an EF of 60-65% with moderately increased LV septal and posterior wall thickness, normal RVSP and no significant valvular disease. He presents today for routine follow up stable from a cardiac standpoint. His VS and EKG remain stable. He remains euvolemic on exam. He seems to be getting around reasonably well, denying any exertional intolerances. He gets fatigued, though this is not new for him. I will have him continue all medications unchanged. He will follow up with us in clinic in one year. He knows to call for any concerns.

## 2023-11-09 ENCOUNTER — TELEMEDICINE (OUTPATIENT)
Dept: PHARMACY | Facility: HOSPITAL | Age: 83
End: 2023-11-09
Payer: MEDICARE

## 2023-11-09 DIAGNOSIS — I48.92 ATRIAL FIBRILLATION AND FLUTTER (MULTI): ICD-10-CM

## 2023-11-09 DIAGNOSIS — I48.91 ATRIAL FIBRILLATION AND FLUTTER (MULTI): ICD-10-CM

## 2023-11-09 DIAGNOSIS — I48.91 ATRIAL FIBRILLATION, UNSPECIFIED TYPE (MULTI): ICD-10-CM

## 2023-11-09 NOTE — Clinical Note
Dhruv Watson! We are going to work with Archie to get him set up for [facility] for his Eliquis. Thank you for referral

## 2023-11-09 NOTE — PROGRESS NOTES
"Pharmacist Clinic: Anticoagulation Management  Archie Pedersen was referred to the Clinical Pharmacy Team for his anticoagulation management.    Referring Provider:  HERACLIO Giron CNP  _______________________________________________________________________  PHARMACY ASSESSMENT    Allergies Reviewed? Yes    Affordability/Accessibility: Unable to afford Eliquis (cost $94). Patient is interested in  Patient Assistance Program.   Adherence/Organization: Adherence       RELEVANT LAB RESULTS  Lab Results   Component Value Date    BILITOT 1.1 06/30/2023    CALCIUM 10.5 06/30/2023    CO2 31 06/30/2023    CL 99 06/30/2023    CREATININE 1.33 (H) 06/30/2023    GLUCOSE 89 06/30/2023    ALKPHOS 119 06/30/2023    K 4.6 06/30/2023    PROT 7.5 06/30/2023     06/30/2023    AST 26 06/30/2023    ALT 12 06/30/2023    BUN 30 (H) 06/30/2023    ANIONGAP 15 06/30/2023    MG 2.00 12/16/2020    PHOS 4.1 10/04/2021    ALBUMIN 4.3 06/30/2023    GFRMALE 53 (A) 06/30/2023     Lab Results   Component Value Date    TRIG 94 03/02/2023    CHOL 107 03/02/2023    HDL 49.7 03/02/2023     No results found for: \"BMCBC\", \"CBCDIF\"       DRUG INTERACTIONS  - No drug interactions  _______________________________________________________________________  ANTICOAGULATION ASSESSMENT    The ASCVD Risk score (Nik DK, et al., 2019) failed to calculate for the following reasons:    The 2019 ASCVD risk score is only valid for ages 40 to 79    The patient has a prior MI or stroke diagnosis    DIAGNOSIS: prevention of nonvalvular atrial fibrilliation stroke and systemic embolism  - Patient is projected to be on anticoagulation   - [id number] Score: [4] (only included if diagnosis is atrial fibrillation)   Age: [<65 (0)] [65-74 (+1)] [> 75 (+2)]: 2  Sex: [Male/Female (+1)]: 1  CHF history: [No/Yes(+1)]: 1  Hypertension history: [No/Yes(+1)]: 0  Stroke/TIA/thromboembolism history: [No/Yes(+2)]: 0  Vascular disease history (prior MI, peripheral artery " disease, aortic plaque): [No/Yes(+1)]: 0  Diabetes history: [No/Yes(+1)]: 0    CURRENT PHARMACOTHERAPY:   - Eliquis 5 mg by mouth twice daily  - Age: 83 y.o  - Weight: 73.7 kg  - Scr: 1.33    REVIEW OF PHARMACOTHERAPY/MEDICAL HISTORY  - Patient has a past medical history of Afib, hyperlipidemia, and HF. Spoke to patient's wife who stated that patient has been taking Eliquis since 2022. Patient denies any recent hospitalization, fall or bleeding. Patient was counseled on the benefit and side effects of Eliquis.     PERTINANT MEDICAL HISTORY:  - Medical history: Afib, hyperlipidemia, HF  - Social history: Never smoke, Alcohol - not currently  _______________________________________________________________________  PATIENT EDUCATION/GOALS  - Counseled patient on MOA, expectations, duration of therapy, contraindications, administration, and monitoring parameters  - Counseled patient of side effects that are indicative of bleeding such as dark tarry stool, unexplainable bruising, or vomiting up a coffee ground like substance  - Answered all patient questions and concerns  _______________________________________________________________________  RECOMMENDATIONS/PLAN  Patient is being screen for  Patient Assistance Program (PAP).    Patient verbally reports monthly or yearly income which is less than 400% federal poverty level    Application for program is still in the process of being submitted for the following medications: Eliquis 5 mg     Prescription Insurance: Yes  Members of Household: 2  Files Taxes: Yes    Patient will be faxing financial information to pharmacist directly at Pharmacy Resident's Fax number: [phone].    Patient aware this process may take up to 6 weeks.     If approved medication must be filled through [facility] pharmacy and mailed to patient.      1. Continue to take Eliquis 5 mg by mouth twice daily. Follow up with patient in 3 months to assess tolerance and side effects.     Next  Cardiology Appointment: 05/07/2024  Clinical Pharmacist follow up: 02/09/2024  Type of Encounter: Gunner CLEVELAND CPhT  PGY-1 Resident     Verbal consent to manage patient's drug therapy was obtained from the patient and cardiologist. They were informed they may decline to participate or withdraw from participation in pharmacy services at any time.    Continue all meds under the continuation of care with the referring provider and clinical pharmacy team.

## 2023-11-10 ENCOUNTER — PHARMACY VISIT (OUTPATIENT)
Dept: PHARMACY | Facility: CLINIC | Age: 83
End: 2023-11-10
Payer: MEDICARE

## 2023-11-10 PROCEDURE — RXMED WILLOW AMBULATORY MEDICATION CHARGE

## 2023-11-22 DIAGNOSIS — E85.9: ICD-10-CM

## 2023-12-07 ENCOUNTER — SPECIALTY PHARMACY (OUTPATIENT)
Dept: PHARMACY | Facility: CLINIC | Age: 83
End: 2023-12-07

## 2023-12-07 ENCOUNTER — TELEPHONE (OUTPATIENT)
Dept: PHARMACY | Facility: HOSPITAL | Age: 83
End: 2023-12-07
Payer: MEDICARE

## 2023-12-07 DIAGNOSIS — I48.91 ATRIAL FIBRILLATION AND FLUTTER (MULTI): Primary | ICD-10-CM

## 2023-12-07 DIAGNOSIS — I48.92 ATRIAL FIBRILLATION AND FLUTTER (MULTI): Primary | ICD-10-CM

## 2023-12-07 PROCEDURE — RXMED WILLOW AMBULATORY MEDICATION CHARGE

## 2023-12-07 NOTE — TELEPHONE ENCOUNTER
Patient Assistance Program Approval:     We are pleased to inform you that your application for assistance has been approved.     This approval is valid through December 7, 2024  as long as the following criteria continue to be satisfied:     Your medication (Eliquis 5 mg) remains covered under your current insurance plan.   Your prescriber does not discontinue therapy.   You do not seek reimbursement from any other private or government-funded programs for the  medication.    Under this program, the pharmacy will first bill your insurance plan for your indemnified specified medication. The [email or web address] Assistance Fund will then offset your copay balance, so that your out-of pocket expense for your specialty medication will be $0.00.    Follow up with patient: 02/09/2024    TOMÁS CLEVELAND CPhT

## 2023-12-11 DIAGNOSIS — E85.82 WILD-TYPE TRANSTHYRETIN-RELATED (ATTR) AMYLOIDOSIS (MULTI): ICD-10-CM

## 2023-12-12 ENCOUNTER — PHARMACY VISIT (OUTPATIENT)
Dept: PHARMACY | Facility: CLINIC | Age: 83
End: 2023-12-12
Payer: MEDICARE

## 2023-12-12 ENCOUNTER — NURSE ONLY (OUTPATIENT)
Dept: RESEARCH | Age: 83
End: 2023-12-12

## 2023-12-13 ENCOUNTER — PHARMACY VISIT (OUTPATIENT)
Dept: PHARMACY | Facility: CLINIC | Age: 83
End: 2023-12-13
Payer: MEDICARE

## 2023-12-27 ENCOUNTER — LAB (OUTPATIENT)
Dept: LAB | Facility: LAB | Age: 83
End: 2023-12-27
Payer: MEDICARE

## 2023-12-27 DIAGNOSIS — N40.1 BENIGN LOCALIZED PROSTATIC HYPERPLASIA WITH LOWER URINARY TRACT SYMPTOMS (LUTS): ICD-10-CM

## 2023-12-27 DIAGNOSIS — E78.2 MIXED HYPERLIPIDEMIA: ICD-10-CM

## 2023-12-27 DIAGNOSIS — E03.8 OTHER SPECIFIED HYPOTHYROIDISM: ICD-10-CM

## 2023-12-27 LAB
ALBUMIN SERPL BCP-MCNC: 4.3 G/DL (ref 3.4–5)
ALP SERPL-CCNC: 96 U/L (ref 33–136)
ALT SERPL W P-5'-P-CCNC: 14 U/L (ref 10–52)
ANION GAP SERPL CALC-SCNC: 13 MMOL/L (ref 10–20)
AST SERPL W P-5'-P-CCNC: 24 U/L (ref 9–39)
BASOPHILS # BLD AUTO: 0.06 X10*3/UL (ref 0–0.1)
BASOPHILS NFR BLD AUTO: 1 %
BILIRUB SERPL-MCNC: 0.9 MG/DL (ref 0–1.2)
BUN SERPL-MCNC: 33 MG/DL (ref 6–23)
CALCIUM SERPL-MCNC: 10.6 MG/DL (ref 8.6–10.6)
CHLORIDE SERPL-SCNC: 102 MMOL/L (ref 98–107)
CHOLEST SERPL-MCNC: 125 MG/DL (ref 0–199)
CHOLESTEROL/HDL RATIO: 2.4
CO2 SERPL-SCNC: 31 MMOL/L (ref 21–32)
CREAT SERPL-MCNC: 1.36 MG/DL (ref 0.5–1.3)
EOSINOPHIL # BLD AUTO: 0.13 X10*3/UL (ref 0–0.4)
EOSINOPHIL NFR BLD AUTO: 2.2 %
ERYTHROCYTE [DISTWIDTH] IN BLOOD BY AUTOMATED COUNT: 14.6 % (ref 11.5–14.5)
GFR SERPL CREATININE-BSD FRML MDRD: 52 ML/MIN/1.73M*2
GLUCOSE SERPL-MCNC: 98 MG/DL (ref 74–99)
HCT VFR BLD AUTO: 47.7 % (ref 41–52)
HDLC SERPL-MCNC: 52.8 MG/DL
HGB BLD-MCNC: 15 G/DL (ref 13.5–17.5)
IMM GRANULOCYTES # BLD AUTO: 0.01 X10*3/UL (ref 0–0.5)
IMM GRANULOCYTES NFR BLD AUTO: 0.2 % (ref 0–0.9)
LDLC SERPL CALC-MCNC: 49 MG/DL
LYMPHOCYTES # BLD AUTO: 1.7 X10*3/UL (ref 0.8–3)
LYMPHOCYTES NFR BLD AUTO: 28.2 %
MCH RBC QN AUTO: 29.9 PG (ref 26–34)
MCHC RBC AUTO-ENTMCNC: 31.4 G/DL (ref 32–36)
MCV RBC AUTO: 95 FL (ref 80–100)
MONOCYTES # BLD AUTO: 0.67 X10*3/UL (ref 0.05–0.8)
MONOCYTES NFR BLD AUTO: 11.1 %
NEUTROPHILS # BLD AUTO: 3.46 X10*3/UL (ref 1.6–5.5)
NEUTROPHILS NFR BLD AUTO: 57.3 %
NON HDL CHOLESTEROL: 72 MG/DL (ref 0–149)
NRBC BLD-RTO: 0 /100 WBCS (ref 0–0)
PLATELET # BLD AUTO: 243 X10*3/UL (ref 150–450)
POTASSIUM SERPL-SCNC: 5.2 MMOL/L (ref 3.5–5.3)
PROT SERPL-MCNC: 7.1 G/DL (ref 6.4–8.2)
PSA SERPL-MCNC: 1.21 NG/ML
RBC # BLD AUTO: 5.02 X10*6/UL (ref 4.5–5.9)
SODIUM SERPL-SCNC: 141 MMOL/L (ref 136–145)
TRIGL SERPL-MCNC: 115 MG/DL (ref 0–149)
TSH SERPL-ACNC: 3.17 MIU/L (ref 0.44–3.98)
VLDL: 23 MG/DL (ref 0–40)
WBC # BLD AUTO: 6 X10*3/UL (ref 4.4–11.3)

## 2023-12-27 PROCEDURE — 84443 ASSAY THYROID STIM HORMONE: CPT

## 2023-12-27 PROCEDURE — 36415 COLL VENOUS BLD VENIPUNCTURE: CPT

## 2023-12-27 PROCEDURE — 80053 COMPREHEN METABOLIC PANEL: CPT

## 2023-12-27 PROCEDURE — 80061 LIPID PANEL: CPT

## 2023-12-27 PROCEDURE — 85025 COMPLETE CBC W/AUTO DIFF WBC: CPT

## 2023-12-27 PROCEDURE — 84153 ASSAY OF PSA TOTAL: CPT

## 2024-01-08 ENCOUNTER — SPECIALTY PHARMACY (OUTPATIENT)
Dept: PHARMACY | Facility: CLINIC | Age: 84
End: 2024-01-08

## 2024-01-08 ENCOUNTER — OFFICE VISIT (OUTPATIENT)
Dept: PRIMARY CARE | Facility: CLINIC | Age: 84
End: 2024-01-08
Payer: MEDICARE

## 2024-01-08 VITALS
WEIGHT: 165.2 LBS | DIASTOLIC BLOOD PRESSURE: 76 MMHG | HEIGHT: 66 IN | OXYGEN SATURATION: 93 % | SYSTOLIC BLOOD PRESSURE: 110 MMHG | HEART RATE: 69 BPM | BODY MASS INDEX: 26.55 KG/M2 | RESPIRATION RATE: 16 BRPM

## 2024-01-08 DIAGNOSIS — M25.562 CHRONIC PAIN OF LEFT KNEE: ICD-10-CM

## 2024-01-08 DIAGNOSIS — E85.4 CARDIAC AMYLOIDOSIS (MULTI): ICD-10-CM

## 2024-01-08 DIAGNOSIS — I48.0 PAROXYSMAL ATRIAL FIBRILLATION (MULTI): ICD-10-CM

## 2024-01-08 DIAGNOSIS — G89.29 CHRONIC PAIN OF LEFT KNEE: ICD-10-CM

## 2024-01-08 DIAGNOSIS — S00.06XD TICK BITE OF SCALP, SUBSEQUENT ENCOUNTER: ICD-10-CM

## 2024-01-08 DIAGNOSIS — E03.8 OTHER SPECIFIED HYPOTHYROIDISM: ICD-10-CM

## 2024-01-08 DIAGNOSIS — I43 CARDIAC AMYLOIDOSIS (MULTI): ICD-10-CM

## 2024-01-08 DIAGNOSIS — I87.2 VENOUS STASIS DERMATITIS OF BOTH LOWER EXTREMITIES: ICD-10-CM

## 2024-01-08 DIAGNOSIS — N18.30 CHRONIC KIDNEY DISEASE (CKD), ACTIVE MEDICAL MANAGEMENT WITHOUT DIALYSIS, STAGE 3 (MODERATE) (MULTI): ICD-10-CM
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
## 2024-01-08 DIAGNOSIS — I50.32 CHRONIC DIASTOLIC HEART FAILURE (MULTI): ICD-10-CM

## 2024-01-08 DIAGNOSIS — N40.1 BENIGN LOCALIZED PROSTATIC HYPERPLASIA WITH LOWER URINARY TRACT SYMPTOMS (LUTS): ICD-10-CM

## 2024-01-08 DIAGNOSIS — R73.03 PREDIABETES: ICD-10-CM

## 2024-01-08 DIAGNOSIS — G20.A1 PARKINSON'S DISEASE WITHOUT DYSKINESIA OR FLUCTUATING MANIFESTATIONS (MULTI): ICD-10-CM

## 2024-01-08 DIAGNOSIS — E78.2 MIXED HYPERLIPIDEMIA: Primary | ICD-10-CM

## 2024-01-08 DIAGNOSIS — I10 HYPERTENSION, UNSPECIFIED TYPE: ICD-10-CM

## 2024-01-08 PROCEDURE — 3078F DIAST BP <80 MM HG: CPT | Performed by: FAMILY MEDICINE

## 2024-01-08 PROCEDURE — 99214 OFFICE O/P EST MOD 30 MIN: CPT | Performed by: FAMILY MEDICINE

## 2024-01-08 PROCEDURE — 1159F MED LIST DOCD IN RCRD: CPT | Performed by: FAMILY MEDICINE

## 2024-01-08 PROCEDURE — 1036F TOBACCO NON-USER: CPT | Performed by: FAMILY MEDICINE

## 2024-01-08 PROCEDURE — RXMED WILLOW AMBULATORY MEDICATION CHARGE

## 2024-01-08 PROCEDURE — 1126F AMNT PAIN NOTED NONE PRSNT: CPT | Performed by: FAMILY MEDICINE

## 2024-01-08 PROCEDURE — 3074F SYST BP LT 130 MM HG: CPT | Performed by: FAMILY MEDICINE

## 2024-01-08 RX ORDER — B-COMPLEX WITH VITAMIN C
CAPSULE ORAL
COMMUNITY
Start: 2020-12-22 | End: 2024-02-06 | Stop reason: ALTCHOICE

## 2024-01-08 RX ORDER — CALCIUM CARBONATE 500(1250)
TABLET ORAL
COMMUNITY
Start: 2015-10-08 | End: 2024-03-12 | Stop reason: ALTCHOICE

## 2024-01-08 RX ORDER — TALC
250 POWDER (GRAM) TOPICAL
COMMUNITY
Start: 2015-10-08

## 2024-01-08 RX ORDER — TRIAMCINOLONE ACETONIDE 1 MG/G
CREAM TOPICAL 2 TIMES DAILY
Qty: 80 G | Refills: 1 | Status: SHIPPED | OUTPATIENT
Start: 2024-01-08

## 2024-01-08 RX ORDER — DOXYCYCLINE HYCLATE 100 MG
TABLET ORAL
COMMUNITY
Start: 2023-12-31 | End: 2024-02-06 | Stop reason: ALTCHOICE

## 2024-01-08 ASSESSMENT — ENCOUNTER SYMPTOMS
DEPRESSION: 0
OCCASIONAL FEELINGS OF UNSTEADINESS: 0
LOSS OF SENSATION IN FEET: 0

## 2024-01-08 NOTE — PROGRESS NOTES
Subjective   Archie Pedersen is a 83 y.o. male who presents for Follow-up (FUV, Tick bite on lower spine ).  HPI    L lower leg swelling and redness, will get hot. Went to ER I nsept and had CT w showed hematoma. Took abx in sept. Getting slightly better.     Trouble walking mady from seated position.     Last dose of doxy was yesterday for tick bite.     More fatigued. Falls asleep easily. No snoring, worse after started study drug for amyloid. Some neck muscle soreness.   Current Outpatient Medications on File Prior to Visit   Medication Sig Dispense Refill    apixaban (Eliquis) 5 mg tablet Take 1 tablet (5 mg) by mouth 2 times a day. 180 tablet 3    atorvastatin (Lipitor) 20 mg tablet Take 1 tablet (20 mg) by mouth once daily. 90 tablet 3    B-complex with vitamin C (Super B/C) capsule Take by mouth.      calcium carbonate (Oscal) 500 mg calcium (1,250 mg) tablet [id number] Medication calcium carbonate 500 mg calcium (1,250 mg) tablet Calcium 500 500 mg calcium (1,250 mg) 10/8/2015 Active (Outside)      doxycycline (Vibra-Tabs) 100 mg tablet TAKE 1 TABLET BY MOUTH EVERY 12 HOURS FOR 7 DAYS. TAKE WITH FOOD. FINISH ALL MEDICATION      furosemide (Lasix) 20 mg tablet Take 1 tablet (20 mg) by mouth once daily. 90 tablet 3    KRILL OIL ORAL Take by mouth.      levothyroxine (Synthroid, Levoxyl) 50 mcg tablet Take 1 tablet (50 mcg) by mouth once daily. 90 tablet 3    magnesium oxide (Mag-Ox) 250 mg magnesium tablet 1 tablet (250 mg).      propranolol (Inderal) 20 mg tablet Take 1.5 tablets (30 mg) by mouth 2 times a day.      spironolactone (Aldactone) 25 mg tablet Take 1 tablet (25 mg) by mouth once daily. 90 tablet 3    tafamidis (Vyndamax) 61 mg capsule TAKE ONE (1) CAPSULE BY MOUTH ONCE DAILY. 30 capsule 3    tamsulosin (Flomax) 0.4 mg 24 hr capsule Take 1 capsule (0.4 mg) by mouth once daily. 90 capsule 3     Current Facility-Administered Medications on File Prior to Visit   Medication Dose Route Frequency Provider  "Last Rate Last Admin    Study [id number]/placebo syringe 45 mg  45 mg subcutaneous Once Malachi Garcia MD MPH        Study [id number]/placebo syringe 45 mg  45 mg subcutaneous Once Malachi Garcia MD MPH                      Objective   /76 (BP Location: Right arm)   Pulse 69   Resp 16   Ht 1.676 m (5' 6\")   Wt 74.9 kg (165 lb 3.2 oz)   SpO2 93%   BMI 26.66 kg/m²    Physical Exam  General: NAD  HEENT:NCAT, PERRLA, nml OP  Neck: no cervical CASI  Heart: RRR no murmur, no edema   Lungs: CTA b/l, no wheeze or rhonchi   MSK: no c/c/e.   B/l knee crepitus  Skin: warm and dry  L lwer ext purpulish discoloration, no warmth or drainage nontender  Mid back puntate lesion w out drainage warmth or target lesion   Psych: cooperative, appropriate affect  Neuro: speech clear. A&Ox3  Assessment/Plan   Problem List Items Addressed This Visit       Benign localized prostatic hyperplasia with lower urinary tract symptoms (LUTS)  PSA nml       Cardiac amyloidosis (CMS/HCC)  In clinical trial  Possible fatigue SE from drug? Rec address w study       Hyperlipidemia - Primary  At goal   Cont statin   Rpt 6 mo       Relevant Orders    CBC and Auto Differential    Comprehensive Metabolic Panel    Lipid Panel    Hypothyroidism  At goal TSH   Cont levo  Rpt 6 mo       Relevant Orders    TSH with reflex to Free T4 if abnormal    Parkinson's disease without dyskinesia or fluctuating manifestations  Improved w propranolol but still present. Likely element of ET        Other Visit Diagnoses       Paroxysmal atrial fibrillation (CMS/HCC)      On AC   UTD w cards       Hypertension, unspecified type      Controlled  Cont current meds       Chronic kidney disease (CKD), active medical management without dialysis, stage 3 (moderate) (CMS/HCC)      Chronic and stable  Avoid nephrotox       Chronic diastolic heart failure (CMS/HCC)      Echo reviewed from 11/2023  Per cards      Chronic pain of left knee   "   RF ortho   May benefit from inj   No NSAIDs d/t AC        Relevant Orders    Referral to Orthopaedic Surgery    Prediabetes      Check A1c in 6 mo       Relevant Orders    Hemoglobin A1C    Venous stasis dermatitis of both lower extremities      Start triamcinolone   Rec comp stockings       Relevant Medications    triamcinolone (Kenalog) 0.1 % cream    Tick bite of scalp, subsequent encounter      Improving   Finish doxy   No signs EM

## 2024-01-09 DIAGNOSIS — E85.4 CARDIAC AMYLOIDOSIS (MULTI): ICD-10-CM

## 2024-01-09 DIAGNOSIS — I43 CARDIAC AMYLOIDOSIS (MULTI): ICD-10-CM

## 2024-01-10 ENCOUNTER — NURSE ONLY (OUTPATIENT)
Dept: RESEARCH | Age: 84
End: 2024-01-10

## 2024-01-10 DIAGNOSIS — I43 CARDIAC AMYLOIDOSIS (MULTI): ICD-10-CM

## 2024-01-10 DIAGNOSIS — E85.4 CARDIAC AMYLOIDOSIS (MULTI): ICD-10-CM

## 2024-01-12 ENCOUNTER — PHARMACY VISIT (OUTPATIENT)
Dept: PHARMACY | Facility: CLINIC | Age: 84
End: 2024-01-12
Payer: MEDICARE

## 2024-01-15 ENCOUNTER — SPECIALTY PHARMACY (OUTPATIENT)
Dept: PHARMACY | Facility: CLINIC | Age: 84
End: 2024-01-15

## 2024-02-05 DIAGNOSIS — E85.4 CARDIAC AMYLOIDOSIS (MULTI): Primary | ICD-10-CM

## 2024-02-05 DIAGNOSIS — I43 CARDIAC AMYLOIDOSIS (MULTI): Primary | ICD-10-CM

## 2024-02-05 PROCEDURE — RXMED WILLOW AMBULATORY MEDICATION CHARGE

## 2024-02-06 ENCOUNTER — HOSPITAL ENCOUNTER (OUTPATIENT)
Dept: CARDIOLOGY | Facility: CLINIC | Age: 84
Discharge: HOME | End: 2024-02-06

## 2024-02-06 ENCOUNTER — OFFICE VISIT (OUTPATIENT)
Dept: CARDIOLOGY | Facility: CLINIC | Age: 84
End: 2024-02-06
Payer: MEDICARE

## 2024-02-06 VITALS
WEIGHT: 174.4 LBS | BODY MASS INDEX: 28.15 KG/M2 | HEART RATE: 71 BPM | DIASTOLIC BLOOD PRESSURE: 79 MMHG | TEMPERATURE: 98.5 F | SYSTOLIC BLOOD PRESSURE: 125 MMHG

## 2024-02-06 DIAGNOSIS — I43 CARDIAC AMYLOIDOSIS (MULTI): ICD-10-CM

## 2024-02-06 DIAGNOSIS — E85.4 CARDIAC AMYLOIDOSIS (MULTI): ICD-10-CM

## 2024-02-06 DIAGNOSIS — E85.82 WILD-TYPE TRANSTHYRETIN-RELATED (ATTR) AMYLOIDOSIS (MULTI): Primary | ICD-10-CM

## 2024-02-06 PROCEDURE — 99214 OFFICE O/P EST MOD 30 MIN: CPT | Performed by: INTERNAL MEDICINE

## 2024-02-06 PROCEDURE — 93005 ELECTROCARDIOGRAM TRACING: CPT

## 2024-02-06 PROCEDURE — 1126F AMNT PAIN NOTED NONE PRSNT: CPT | Performed by: INTERNAL MEDICINE

## 2024-02-06 PROCEDURE — 1036F TOBACCO NON-USER: CPT | Performed by: INTERNAL MEDICINE

## 2024-02-06 PROCEDURE — 1159F MED LIST DOCD IN RCRD: CPT | Performed by: INTERNAL MEDICINE

## 2024-02-06 PROCEDURE — 93010 ELECTROCARDIOGRAM REPORT: CPT | Performed by: INTERNAL MEDICINE

## 2024-02-06 NOTE — PROGRESS NOTES
Advanced Heart Failure and Cardiac Transplantation Cardiology    Archie Pedersen is a 83 y.o. male from [facility], retired , here for routine visit.     He is enrolled in CARDIO-ATTransform (RCT studying eplontersen vs. placebo for treatment of transthyretin cardiac amyloidosis).    He seems to be OK. His primary complaint is fatigue. On exam he looks entirely euvolemic. His recent CBC showed normal hemoglobin levels.    Exam: /79 (BP Location: Left arm, Patient Position: Sitting, BP Cuff Size: Adult)   Pulse 71   Temp 36.9 °C (98.5 °F) (Temporal)   Wt 79.1 kg (174 lb 6.4 oz)   BMI 28.15 kg/m²   No JVD  Irregular pulse  CTA  No LE edema    Current Outpatient Medications   Medication Instructions    atorvastatin (LIPITOR) 20 mg, oral, Daily    calcium carbonate (Oscal) 500 mg calcium (1,250 mg) tablet [id number] Medication calcium carbonate 500 mg calcium (1,250 mg) tablet Calcium 500 500 mg calcium (1,250 mg) 10/8/2015 Active (Outside)    Eliquis 5 mg, oral, 2 times daily    furosemide (LASIX) 20 mg, oral, Daily    KRILL OIL ORAL oral    levothyroxine (SYNTHROID, LEVOXYL) 50 mcg, oral, Daily    magnesium oxide (MAG-OX) 250 mg    propranolol (Inderal) 20 mg tablet 1.5 tablets, oral, 2 times daily    spironolactone (ALDACTONE) 25 mg, oral, Daily    tafamidis (VYNDAMAX) 61 mg, oral, Daily    tamsulosin (FLOMAX) 0.4 mg, oral, Daily    triamcinolone (Kenalog) 0.1 % cream Topical, 2 times daily, Apply to affected area 1-2 times daily as needed. Avoid face and groin.     Cardiovascular history:  -- wt-ATTR cardiac amyloidosis  -- Stage C HFpEF, NYHA class II  -- Atrial fibrillation, on DOAC    ECG done in [facility] today 2/6/2024 shows: atrial fibrillation, ventricular rate 72 BPM, RBBB    Assessment: 83 y.o. 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 Enrolled in CARDIO-ATTransform RCT. Stable and doing OK at this time.    Plan:  -- Continue tafamidis and clinical trial drug (eplontersen vs. Placebo)  -- Routine follow-up  per study protocol    Malachi Garcia MD, MPH  Advanced Heart Failure and Transplant Cardiology  [facility]

## 2024-02-06 NOTE — PATIENT INSTRUCTIONS
To reach Dr. Garcia's office please call [phone] ([facility]). Fax [phone]. Call [phone] to schedule an appointment. You may also contact the HF RNs at [email or web address]     Thank you for coming to your appointment today. If you have any questions or need cardiac medication refills, please call the Heart Failure Office at [phone] option [phone].   You may also contact the HF RNs at [email or web address]      Lizeth will schedule a follow up appointment

## 2024-02-07 DIAGNOSIS — I43 CARDIAC AMYLOIDOSIS (MULTI): ICD-10-CM

## 2024-02-07 DIAGNOSIS — E85.4 CARDIAC AMYLOIDOSIS (MULTI): ICD-10-CM

## 2024-02-09 ENCOUNTER — TELEMEDICINE (OUTPATIENT)
Dept: PHARMACY | Facility: HOSPITAL | Age: 84
End: 2024-02-09
Payer: MEDICARE

## 2024-02-09 DIAGNOSIS — I48.92 ATRIAL FIBRILLATION AND FLUTTER (MULTI): Primary | ICD-10-CM

## 2024-02-09 DIAGNOSIS — I48.91 ATRIAL FIBRILLATION, UNSPECIFIED TYPE (MULTI): ICD-10-CM

## 2024-02-09 DIAGNOSIS — I48.91 ATRIAL FIBRILLATION AND FLUTTER (MULTI): Primary | ICD-10-CM

## 2024-02-09 NOTE — Clinical Note
Charles Watson! My resident completed a follow up with Archie regarding his Eliquis. No SE, hospitalizations, or falls to report. Will continue to follow.

## 2024-02-09 NOTE — PROGRESS NOTES
"Pharmacist Clinic: Anticoagulation Management  Archie Pedersen was referred to the Clinical Pharmacy Team for his anticoagulation management.    Referring Provider:  Shirley Jenkins    Last Appointment w/ Pharmacist: 11/9/2023  Pharmacist Name: Marietta Andreiajacinto  _______________________________________________________________________  PHARMACY ASSESSMENT    Review of Past Appointment:   -  PAP was approved for Eliquis until 12/7/2024.    RELEVANT LAB RESULTS  Lab Results   Component Value Date    BILITOT 0.9 12/27/2023    CALCIUM 10.6 12/27/2023    CO2 31 12/27/2023     12/27/2023    CREATININE 1.36 (H) 12/27/2023    GLUCOSE 98 12/27/2023    ALKPHOS 96 12/27/2023    K 5.2 12/27/2023    PROT 7.1 12/27/2023     12/27/2023    AST 24 12/27/2023    ALT 14 12/27/2023    BUN 33 (H) 12/27/2023    ANIONGAP 13 12/27/2023    MG 2.00 12/16/2020    PHOS 4.1 10/04/2021    ALBUMIN 4.3 12/27/2023    GFRMALE 53 (A) 06/30/2023     Lab Results   Component Value Date    TRIG 115 12/27/2023    CHOL 125 12/27/2023    LDLCALC 49 12/27/2023    HDL 52.8 12/27/2023     No results found for: \"BMCBC\", \"CBCDIF\"   _______________________________________________________________________  ANTICOAGULATION ASSESSMENT    The ASCVD Risk score (Nik VILLALPANDO, et al., 2019) failed to calculate for the following reasons:    The 2019 ASCVD risk score is only valid for ages 40 to 79    The patient has a prior MI or stroke diagnosis    DIAGNOSIS: prevention of nonvalvular atrial fibrilliation stroke and systemic embolism  - [id number] Score: [3] (only included if diagnosis is atrial fibrillation)   Age: [<65 (0)] [65-74 (+1)] [> 75 (+2)]: 2  Sex: [Male/Female (+1)]: 0  CHF history: [No/Yes(+1)]: 1  Hypertension history: [No/Yes(+1)]: 0  Stroke/TIA/thromboembolism history: [No/Yes(+2)]: 0  Vascular disease history (prior MI, peripheral artery disease, aortic plaque): [No/Yes(+1)]: 0  Diabetes history: [No/Yes(+1)]: 0    CURRENT PHARMACOTHERAPY:   - " Eliquis 5 mg BID   -Age: 83 years old   -Weight: 79.1 kg (174 lbs) as of 2/6/2024   -Scr: 1.36 as of 12/27/2023    UPDATE ON PHARMACOTHERAPY:   Affordability/Accessibility: Approved for [facility] until 12/7/2024  Adherence/Organization: patient has no problems getting the medication from  mail order pharmacy  Adverse Effects: None  Recent Hospitalizations: None  Recent Falls/Trauma: None  Changes in Tobacco or Alcohol Intake: NA    DISCUSSION/NOTES:  - Patient has a past medical history of A fib, CHF, HLD, and cardiomyopathy. He is currently on Eliquis 5 mg BID and is doing well with the Eliquis therapy, he does not complain of any adverse effects at this time.     _______________________________________________________________________  PATIENT EDUCATION/GOALS  - Counseled patient on MOA, expectations, duration of therapy, contraindications, administration, and monitoring parameters  - Counseled patient of side effects that are indicative of bleeding such as dark tarry stool, unexplainable bruising, or vomiting up a coffee ground like substance  - Answered all patient questions and concerns  _______________________________________________________________________  RECOMMENDATIONS/PLAN  1. Continue Eliquis 5 mg BID    Next Cardiology Appointment: 5/2/2024  Clinical Pharmacist follow up: 5/10/2024  VAF/Application Expiration: Yes    Date: 12/7/2024  Type of Encounter: Gunner Mendoza, PharmD    Verbal consent to manage patient's drug therapy was obtained from the patient . They were informed they may decline to participate or withdraw from participation in pharmacy services at any time.    Continue all meds under the continuation of care with the referring provider and clinical pharmacy team.

## 2024-02-12 LAB
ATRIAL RATE: 78 BPM
Q ONSET: 210 MS
QRS COUNT: 12 BEATS
QRS DURATION: 126 MS
QT INTERVAL: 408 MS
QTC CALCULATION(BAZETT): 446 MS
QTC FREDERICIA: 433 MS
R AXIS: 185 DEGREES
T AXIS: -13 DEGREES
T OFFSET: 414 MS
VENTRICULAR RATE: 72 BPM

## 2024-02-15 ENCOUNTER — PHARMACY VISIT (OUTPATIENT)
Dept: PHARMACY | Facility: CLINIC | Age: 84
End: 2024-02-15
Payer: MEDICARE

## 2024-02-19 DIAGNOSIS — I43 CARDIAC AMYLOIDOSIS (MULTI): ICD-10-CM

## 2024-02-19 DIAGNOSIS — E85.4 CARDIAC AMYLOIDOSIS (MULTI): ICD-10-CM

## 2024-02-20 ENCOUNTER — OFFICE VISIT (OUTPATIENT)
Dept: NEUROLOGY | Facility: CLINIC | Age: 84
End: 2024-02-20
Payer: MEDICARE

## 2024-02-20 VITALS — SYSTOLIC BLOOD PRESSURE: 112 MMHG | DIASTOLIC BLOOD PRESSURE: 76 MMHG | RESPIRATION RATE: 18 BRPM | HEART RATE: 87 BPM

## 2024-02-20 DIAGNOSIS — G25.0 ESSENTIAL TREMOR: Primary | ICD-10-CM

## 2024-02-20 PROCEDURE — 1160F RVW MEDS BY RX/DR IN RCRD: CPT | Performed by: PSYCHIATRY & NEUROLOGY

## 2024-02-20 PROCEDURE — 99213 OFFICE O/P EST LOW 20 MIN: CPT | Performed by: PSYCHIATRY & NEUROLOGY

## 2024-02-20 PROCEDURE — 1126F AMNT PAIN NOTED NONE PRSNT: CPT | Performed by: PSYCHIATRY & NEUROLOGY

## 2024-02-20 PROCEDURE — 1159F MED LIST DOCD IN RCRD: CPT | Performed by: PSYCHIATRY & NEUROLOGY

## 2024-02-20 PROCEDURE — 1036F TOBACCO NON-USER: CPT | Performed by: PSYCHIATRY & NEUROLOGY

## 2024-02-20 RX ORDER — SAW PALMETTO 160 MG
CAPSULE ORAL EVERY 12 HOURS
COMMUNITY
Start: 2020-12-28

## 2024-02-20 RX ORDER — SPIRONOLACTONE AND HYDROCHLOROTHIAZIDE 25; 25 MG/1; MG/1
TABLET ORAL
COMMUNITY
Start: 2021-07-07 | End: 2024-03-12 | Stop reason: ALTCHOICE

## 2024-02-20 ASSESSMENT — FAHN TOLOSA MARTIN TREMOR (FTM)
HEAD TOTAL SCORE: 0
UE ARM AT REST: 0
UE ARMS OUTSTRECHED WRISTS MILDLY EXTENDED FINGERS SPREAD APART: 1
FACE AT POSTURE WHEN STANDING OR SITTING: 0
LE TOE TO FINGER IN A FLEXED POSTURE: 0
FACE TOTAL: 0
RUE TOTAL SCORE: 2
TOUNGE AT REST: 0
UE ARM AT REST: 0
LE AT REST: 0
TOUNGE TOTAL SCORE: 0
LE LEG FLEXED AT HIPS AND KNEES AT POSTURE: 0
LLE TOTAL SCORE: 0
UE ARMS OUTSTRECHED WRISTS MILDLY EXTENDED FINGERS SPREAD APART: 1
LE LEG FLEXED AT HIPS AND KNEES AT POSTURE: 0
TRUNK IN REPOSE: 0
FACE IN REPOSE: 0
RLE TOTAL SCORE: 0
UE FINGER TO NOSE AND OTHER ACTIONS: 1
TOUNGE WHEN PROTUDED: 0
TRUNK TOTAL SCORE: 0
HEAD IN REPOSE: 0
UE FINGER TO NOSE AND OTHER ACTIONS: 1
VOICE TOTAL SCORE: 0
TRUNK  WHEN SITTING OR STANDING: 0
LE AT REST: 0
PART A SUBTOTAL SCORE: 4
HEAD AT POSTURE WHEN STANDING OR SITTING: 0
VOICE IN ACTION: 0
LE TOE TO FINGER IN A FLEXED POSTURE: 0
RUE TOTAL SCORE: 2

## 2024-02-20 ASSESSMENT — ENCOUNTER SYMPTOMS
OCCASIONAL FEELINGS OF UNSTEADINESS: 0
LOSS OF SENSATION IN FEET: 0
DEPRESSION: 0

## 2024-02-20 ASSESSMENT — PATIENT HEALTH QUESTIONNAIRE - PHQ9
SUM OF ALL RESPONSES TO PHQ9 QUESTIONS 1 AND 2: 0
1. LITTLE INTEREST OR PLEASURE IN DOING THINGS: NOT AT ALL
2. FEELING DOWN, DEPRESSED OR HOPELESS: NOT AT ALL

## 2024-02-20 NOTE — PROGRESS NOTES
Subjective     Archie Pedersen is a   83 y.o. year old male who presents with FUV of tremor.  Visit type: follow up visit     HPI  Tremor is there but better w/ propranolol, can eat without spilling now, can sign his name, button his shirt, so propranolol is helping.   Stiff from OA he states.  Walks slowly but otherwise none.   Denies balance issues.   Denies falls.            Meds  propranolol 30mg 2 times a day  SE: none at this dose (higher=dizziness)     Previously tried:   Primidone  Sinemet SE worsening of tremor     Active Problems  Problems    · Abnormal liver scan (794.8) (R93.2)   · Abnormal TSH (790.6) (R79.89)   · Atrial fibrillation and flutter (427.31,427.32) (I48.91,I48.92)   · Basal cell carcinoma of upper lip (173.01) (C44.01)   · Benign localized prostatic hyperplasia with lower urinary tract symptoms (LUTS)  (600.21,599.69) (N40.1)   · BMI 25.0-25.9,adult (V85.21) (Z68.25)   · BMI 26.0-26.9,adult (V85.22) (Z68.26)   · BPH with elevated PSA (600.00,790.93) (N40.0,R97.20)   · 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 Declines new set.   · Depression screening (V79.0) (Z13.31)   · Diastolic heart failure, stage C (428.30) (I50.30)   · Dyspnea on exertion (786.09) (R06.09)   · Essential tremor (333.1) (G25.0)   · Eustachian tube disorder (381.9) (H69.90)   · High risk medication use (V58.69) (Z79.899)   · Hoarseness (784.42) (R49.0)   · Hyperlipidemia (272.4) (E78.5)   · Hypothyroidism (244.9) (E03.9)   · Laryngopharyngeal reflux (LPR) (478.79) (K21.9)   · Low BP (458.9) (I95.9)   · Lumbar paraspinal muscle spasm (724.8) (M62.830)   · Need for influenza vaccination (V04.81) (Z23)   · Normocytic anemia  (285.9) (D64.9)   · Osteoarthritis of knee (715.36) (M17.9)   · Parkinsonism, unspecified Parkinsonism type (332.0) (G20)   · Research study patient (V70.7) (Z00.6)   · Suprapubic pain (789.09) (R10.2)   · Weight loss (783.21) (R63.4)     Past Medical History  Problems    · History of Adenomatous polyp (V13.89) (Z86.018)   · Resolved Date: 20 Mar 2019   · History of atrial flutter (V12.59) (Z86.79)   · Resolved Date: 2021   · History of renal calculi (V13.01) (Z87.442)   · Resolved Date: 2020   · History of seborrheic keratosis (V13.3) (Z87.2)   · Resolved Date: 20 Mar 2019   · History of squamous cell carcinoma of skin (V10.83) (Z85.828)   · Followed by derm   · History of Left nephrolithiasis (592.0) (N20.0)   · Resolved Date: 02 Mar 2022     Surgical History  Problems    · History of Cystoscopy   · History of Renal lithotripsy   · History of Skin lesion excision   · Skin cancer removed from lip and ear   · History of Tooth extraction     Family History  Mother    · No pertinent family history  Father    · Family history of sudden death (V19.8) (Z84.89)   Father and brother both have  in their sleep, age unknown  Brother    · Family history of sudden death (V19.8) (Z84.89)   Father and brother both have  in their sleep, age unknown     Social History  Problems    · Alcohol use (V49.89) (Z78.9)   · No illicit drug use   · Tobacco use (305.1) (Z72.0)   · Max 1 pack Q 2weeks ages 19 to 22. Rare cigars now.   · Uses chewing tobacco       Review of Systems  All other system have been reviewed and are negative for complaint.  Objective   Neurological Exam    Physical Exam                       Physical Exam     MDS UPDRS Examination   Is the patient on medication for treating the symptoms of Parkinson's disease? No.   Is the patient on levodopa? No.   Speech: 0   Facial Expression: 1   Rigidity: Neck - 1. RUE - 1. RLE - 1. LUE - 1. LLE - 1.   Finger Taps: Right - 1. Left - 2.   Hand Movements:  Right - 1. Left - 1.   Pronation-Supination: Right - 1. Left - 2.   Toe Tapping: Right - 1. Left - 1.   Leg Agility: Right - 1. Left - 1.   Arising from chair: 1   Gait: 1   Freezing of Gait: 0   Posture: 1   Body Bradykinesia: 1   Postural Tremor: Right Hand - 1.  Lt hand:1  Kinetic Tremor: Right Hand - 1. Left Hand - 1.   Rest Tremor: RUE - 1. RLE - 0. LUE - 1. LLE - 0.   Lip/Jaw: 0   Constancy of Rest Tremor: 1 Dyskinesias were not present during exam.            Office Visit from 2/20/2024 in [facility] with Mustapha Cortes MD    2/20/2024    1441   PART A     Face at rest 0   Face at posture 0   Face Total 0   Tongue at rest 0   Tongue at posture 0   Tongue total 0   Voice in action 0   Voice total 0   Head at rest 0   Head at posture 0   Head total 0   RUE at rest 0   RUE at posture 1   RUE in action 1   RUE total 2   LUE at rest 0   LUE at posture 1   LUE in action 1   RUE total 2   Trunk at rest 0   Trunk at posture 0   Trunk total 0   RLE at rest 0   RLE at posture 0   RLE in action 0   RLE total 0   LLE at rest 0   LLE at posture 0   LLE in action 0   LLE total 0   Part A subtotal 4              Assessment/Plan Mr. Pedersen is an 84 YO man with ET and parkinsonism here for FUV. Marked improvement in tremors on propranolol in regards to ADLs (eating, dressing,writing), has mild tremor still that is not bothersome, 30mg BID raised to 40mg BID without improvement here and SE orthostatic lightheadedness that resolved going back to 30mg BID. There are mild bradykinesia LT>RT  And mild rigidity n both upper and lower extremities which did not progress compared to last visit.Therefore we plan continue with propranolol 30 mg BID for ET.      Plan    Continue propranolol 30 mg BID.  FUV in 9 months

## 2024-02-20 NOTE — PROGRESS NOTES
Subjective     Archie Pedersen is a 83 y.o. male who presents for the following: Skin Exam.  The patient notes a new bump on the back of his left ear, which he noticed a few months ago and has increased in size.  It sometimes hurts to touch, but it does not itch or bleed.  He and his wife also note intermittent pimple breakouts on his scalp.      Review of Systems:  No other skin or systemic complaints other than what is documented elsewhere in the note.    The following portions of the chart were reviewed this encounter and updated as appropriate:       Skin Cancer History  No skin cancer on file.    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 Esquivel on 4/6/23  - BCC on left lateral lower cutaneous lip diagnosed on 2/8/22 s/p Mohs surgery by Dr. 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 Esquivel on 5/24/16 and on right ear and nose treated by an outside Dermatologist    - AKs    - no h/o melanoma    Family History:    No family history of melanoma or skin cancer    Social History:    The patient and his wife live in [place]    Allergies:  Penicillins    Current Medications / CAM's:    Current Outpatient Medications:     apixaban (Eliquis) 5 mg tablet, Take 1 tablet (5 mg) by mouth 2 times a day., Disp: 180 tablet, Rfl: 3    atorvastatin (Lipitor) 20 mg tablet, Take 1 tablet (20 mg) by mouth once daily., Disp: 90 tablet, Rfl: 3    calcium carbonate (Oscal) 500 mg calcium  (1,250 mg) tablet, 349330 Medication calcium carbonate 500 mg calcium (1,250 mg) tablet Calcium 500 500 mg calcium (1,250 mg) 10/8/2015 Active (Outside), Disp: , Rfl:     clindamycin (Cleocin T) 1 % lotion, Apply topically 2 times a day., Disp: 60 mL, Rfl: 11    furosemide (Lasix) 20 mg tablet, Take 1 tablet (20 mg) by mouth once daily., Disp: 90 tablet, Rfl: 3    KRILL OIL ORAL, Take by mouth., Disp: , Rfl:     levothyroxine (Synthroid, Levoxyl) 50 mcg tablet, Take 1 tablet (50 mcg) by mouth once daily., Disp: 90 tablet, Rfl: 3    magnesium oxide (Mag-Ox) 250 mg magnesium tablet, 1 tablet (250 mg)., Disp: , Rfl:     propranolol (Inderal) 20 mg tablet, Take 1.5 tablets (30 mg) by mouth 2 times a day., Disp: , Rfl:     saw palmetto 160 mg capsule, Take by mouth every 12 hours., Disp: , Rfl:     spironolacton-hydrochlorothiaz (Aldactazide) 25-25 mg tablet, 198224 Medication spironolactone 25 mg tablet spironolactone 25 mg tablet 25 mg 7/7/2021 Active (Outside), Disp: , Rfl:     spironolactone (Aldactone) 25 mg tablet, Take 1 tablet (25 mg) by mouth once daily., Disp: 90 tablet, Rfl: 3    tafamidis (Vyndamax) 61 mg capsule, Take 1 capsule (61 mg) by mouth once daily., Disp: 30 capsule, Rfl: 11    tamsulosin (Flomax) 0.4 mg 24 hr capsule, Take 1 capsule (0.4 mg) by mouth once daily., Disp: 90 capsule, Rfl: 3    triamcinolone (Kenalog) 0.1 % cream, Apply topically 2 times a day. Apply to affected area 1-2 times daily as needed. Avoid face and groin., Disp: 80 g, Rfl: 1    Current Facility-Administered Medications:     Study [id number]/placebo syringe 45 mg, 45 mg, subcutaneous, Once, Malachi Garcia MD MPH    Study [id number]/placebo syringe 45 mg, 45 mg, subcutaneous, Once, Malachi Garcia MD MPH    Study [id number]/placebo syringe 45 mg, 45 mg, subcutaneous, Once, Malahci Garcia MD MPH    Study [id number]/placebo syringe 45 mg, 45 mg, subcutaneous, Once,  Malachi Garcia MD MPH    [START ON 2/22/2024] Study [id number]/placebo syringe 45 mg, 45 mg, subcutaneous, Once, Malachi Garcia MD MPH     Objective   Well appearing patient in no apparent distress; mood and affect are within normal limits.    A waist-up examination was performed including scalp, face, eyes, ears, nose, lips, neck, chest, axillae, abdomen, back, and bilateral upper extremities. All findings within normal limits unless otherwise noted below.        Assessment/Plan   1. Neoplasm of uncertain behavior of skin (2)  Left Posterior Mid-Ear  7 mm erythematous, scaly papule           Shave removal    Lesion length (cm):  0.7  Margin per side (cm):  0  Lesion diameter (cm):  0.7  Informed consent: discussed and consent obtained    Timeout: patient name, date of birth, surgical site, and procedure verified    Procedure prep:  Patient was prepped and draped  Anesthesia: the lesion was anesthetized in a standard fashion    Anesthetic:  1% lidocaine w/ epinephrine 1-100,000 local infiltration  Instrument used: flexible razor blade    Hemostasis achieved with: aluminum chloride    Outcome: patient tolerated procedure well    Post-procedure details: sterile dressing applied and wound care instructions given    Dressing type: bandage and petrolatum      Staff Communication: Dermatology Local Anesthesia: 1 % Lidocaine / Epinephrine - Amount:0.5ml    Specimen 1 - Dermatopathology- DERM LAB  Differential Diagnosis: SCCIS v SK v HAK  Check Margins Yes/No?:    Comments:    Dermpath Lab: Routine Histopathology (formalin-fixed tissue)    Right Mid-Back  1 cm pink, shiny papule           Lesion biopsy  Type of biopsy: tangential    Informed consent: discussed and consent obtained    Timeout: patient name, date of birth, surgical site, and procedure verified    Procedure prep:  Patient was prepped and draped  Anesthesia: the lesion was anesthetized in a standard fashion    Anesthetic:  1% lidocaine w/  epinephrine 1-100,000 local infiltration  Instrument used: DermaBlade    Hemostasis achieved with: aluminum chloride    Outcome: patient tolerated procedure well    Post-procedure details: sterile dressing applied and wound care instructions given    Dressing type: petrolatum and bandage      Staff Communication: Dermatology Local Anesthesia: 1 % Lidocaine / Epinephrine - Amount:0.5ml    Specimen 2 - Dermatopathology- DERM LAB  Differential Diagnosis: BLK v BCC  Check Margins Yes/No?:    Comments:    Dermpath Lab: Routine Histopathology (formalin-fixed tissue)    2. Actinic keratosis (16)  Mid Frontal Scalp (16)  Scattered on the patient's face and scalp, there are multiple erythematous, gritty, scaly macules     Actinic Keratoses - scattered on face and scalp.  The pre-cancerous nature of these lesions and treatment options were discussed with the patient today.  At this time, I recommend treatment with liquid nitrogen cryotherapy.  The patient expressed understanding, is in agreement with this plan, and wishes to proceed with cryotherapy today.    Destr of lesion - Mid Frontal Scalp  Complexity: simple    Destruction method: cryotherapy    Informed consent: discussed and consent obtained    Lesion destroyed using liquid nitrogen: Yes    Cryotherapy cycles:  1  Outcome: patient tolerated procedure well with no complications    Post-procedure details: wound care instructions given      3. Folliculitis  Mid Frontal Scalp  Scattered on the patient's scalp, there are several follicular-based erythematous, inflammatory papules and pustules    Folliculitis - scalp.  The bacterial nature of this condition and treatment options were discussed with the patient today.  At this time, I recommend topical antibiotic therapy with Clindamycin 1% lotion, which the patient was instructed to apply twice daily to the affected areas or up to 3-4 times per day as needed for active lesions.  The risks, benefits, and side effects of this  medication were discussed.  The patient expressed understanding and is in agreement with this plan.    clindamycin (Cleocin T) 1 % lotion - Mid Frontal Scalp  Apply topically 2 times a day.    4. Melanocytic nevus of trunk  Scattered on the patient's face, neck, trunk, and bilateral upper extremities, there are several small, round- to oval-shaped, brown-pigmented and pink-colored, symmetric, uniform-appearing macules and dome-shaped papules    Clinically benign- to slightly atypical-appearing nevi - the clinically benign- to slightly atypical-appearing nature of the patient's nevi was discussed with the patient today.  None of the patient's nevi meet threshold for biopsy today.  I emphasized the importance of performing monthly self-skin exams using the ABCDs of monitoring moles, which were reviewed with the patient today and an informational hand-out provided.  I also emphasized the importance of sun avoidance and sun protection with daily sunscreen use.  The patient expressed understanding and is in agreement with this plan.    5. Seborrheic keratosis  Scattered on the patient's face, neck, trunk, and bilateral upper extremities, there are multiple tan- to light brown-colored, hyperkeratotic, stuck-on appearing papules of varying size and shape    Seborrheic Keratoses - the benign nature of these lesions was discussed with the patient today and reassurance provided.  No treatment is medically indicated for these lesions at this time.    6. History of nonmelanoma skin cancer  On the patient's left temple, left lateral lower cutaneous lip, left medial lower back, right upper lateral chest, right upper cutaneous lip, right ear, nose, there are well-healed scars with no evidence of recurrent growth on exam today.    History of nonmelanoma skin cancers and actinic keratoses and photodamage.  There is no evidence of recurrence at the sites of the patient's previously treated NMSCs on exam today.  The signs and symptoms  of skin cancer were reviewed and the patient was advised to practice sun protection and sun avoidance, use daily sunscreen, and perform regular self skin exams.  I will have the patient return to our office in 4 to 6 months, pending the above biopsy results, for routine follow-up and skin exam, and the patient was instructed to call our office should the patient notice any new, changing, symptomatic, or otherwise concerning skin lesions before then.  The patient expressed understanding and is in agreement with this plan.    7. Diffuse photodamage of skin  Photodistributed  Diffuse photodamage with actinic changes with telangiectasia and mottled pigmentation in sun-exposed areas.    Photodamage.  The signs and symptoms of skin cancer were reviewed and the patient was advised to practice sun protection and sun avoidance, use daily sunscreen, and perform regular self skin exams.  Sun protection was discussed, including avoiding the mid-day sun, wearing a sunscreen with SPF at least 50, and stressing the need for reapplication of sunscreen and applying more than they think they need.

## 2024-02-21 ENCOUNTER — OFFICE VISIT (OUTPATIENT)
Dept: DERMATOLOGY | Facility: CLINIC | Age: 84
End: 2024-02-21
Payer: MEDICARE

## 2024-02-21 DIAGNOSIS — Z85.828 HISTORY OF NONMELANOMA SKIN CANCER: ICD-10-CM

## 2024-02-21 DIAGNOSIS — L82.1 SEBORRHEIC KERATOSIS: ICD-10-CM

## 2024-02-21 DIAGNOSIS — L57.0 ACTINIC KERATOSIS: ICD-10-CM

## 2024-02-21 DIAGNOSIS — L57.8 DIFFUSE PHOTODAMAGE OF SKIN: ICD-10-CM

## 2024-02-21 DIAGNOSIS — D48.5 NEOPLASM OF UNCERTAIN BEHAVIOR OF SKIN: Primary | ICD-10-CM

## 2024-02-21 DIAGNOSIS — D22.5 MELANOCYTIC NEVUS OF TRUNK: ICD-10-CM

## 2024-02-21 DIAGNOSIS — L73.9 FOLLICULITIS: ICD-10-CM

## 2024-02-21 PROCEDURE — 11311 SHAVE SKIN LESION 0.6-1.0 CM: CPT | Performed by: DERMATOLOGY

## 2024-02-21 PROCEDURE — 1126F AMNT PAIN NOTED NONE PRSNT: CPT | Performed by: DERMATOLOGY

## 2024-02-21 PROCEDURE — 99214 OFFICE O/P EST MOD 30 MIN: CPT | Performed by: DERMATOLOGY

## 2024-02-21 PROCEDURE — 1160F RVW MEDS BY RX/DR IN RCRD: CPT | Performed by: DERMATOLOGY

## 2024-02-21 PROCEDURE — 17004 DESTROY PREMAL LESIONS 15/>: CPT | Performed by: DERMATOLOGY

## 2024-02-21 PROCEDURE — 11102 TANGNTL BX SKIN SINGLE LES: CPT | Performed by: DERMATOLOGY

## 2024-02-21 PROCEDURE — 1159F MED LIST DOCD IN RCRD: CPT | Performed by: DERMATOLOGY

## 2024-02-21 PROCEDURE — 1036F TOBACCO NON-USER: CPT | Performed by: DERMATOLOGY

## 2024-02-21 RX ORDER — CLINDAMYCIN PHOSPHATE 10 UG/ML
LOTION TOPICAL 2 TIMES DAILY
Qty: 60 ML | Refills: 11 | Status: SHIPPED | OUTPATIENT
Start: 2024-02-21 | End: 2025-02-20

## 2024-02-21 ASSESSMENT — DERMATOLOGY PATIENT ASSESSMENT
DO YOU HAVE ANY NEW OR CHANGING LESIONS: NO
DO YOU USE A TANNING BED: NO

## 2024-02-21 ASSESSMENT — DERMATOLOGY QUALITY OF LIFE (QOL) ASSESSMENT: ARE THERE EXCLUSIONS OR EXCEPTIONS FOR THE QUALITY OF LIFE ASSESSMENT: NO

## 2024-02-22 ENCOUNTER — NURSE ONLY (OUTPATIENT)
Dept: RESEARCH | Age: 84
End: 2024-02-22

## 2024-02-23 LAB
LABORATORY COMMENT REPORT: NORMAL
PATH REPORT.FINAL DX SPEC: NORMAL
PATH REPORT.GROSS SPEC: NORMAL
PATH REPORT.MICROSCOPIC SPEC OTHER STN: NORMAL
PATH REPORT.RELEVANT HX SPEC: NORMAL
PATH REPORT.TOTAL CANCER: NORMAL

## 2024-02-26 ENCOUNTER — TELEPHONE (OUTPATIENT)
Dept: DERMATOLOGY | Facility: CLINIC | Age: 84
End: 2024-02-26
Payer: MEDICARE

## 2024-03-04 PROCEDURE — RXMED WILLOW AMBULATORY MEDICATION CHARGE

## 2024-03-06 ENCOUNTER — PHARMACY VISIT (OUTPATIENT)
Dept: PHARMACY | Facility: CLINIC | Age: 84
End: 2024-03-06
Payer: MEDICARE

## 2024-03-11 ENCOUNTER — SPECIALTY PHARMACY (OUTPATIENT)
Dept: PHARMACY | Facility: CLINIC | Age: 84
End: 2024-03-11

## 2024-03-11 PROCEDURE — RXMED WILLOW AMBULATORY MEDICATION CHARGE

## 2024-03-12 ENCOUNTER — OFFICE VISIT (OUTPATIENT)
Dept: CARDIOLOGY | Facility: CLINIC | Age: 84
End: 2024-03-12
Payer: MEDICARE

## 2024-03-12 VITALS
DIASTOLIC BLOOD PRESSURE: 77 MMHG | SYSTOLIC BLOOD PRESSURE: 127 MMHG | HEART RATE: 88 BPM | TEMPERATURE: 97.9 F | BODY MASS INDEX: 27.02 KG/M2 | WEIGHT: 167.4 LBS

## 2024-03-12 DIAGNOSIS — E03.9 HYPOTHYROIDISM, UNSPECIFIED TYPE: ICD-10-CM

## 2024-03-12 NOTE — PATIENT INSTRUCTIONS
To reach Dr. Garcia's office please call [phone] ([facility]). Fax [phone]. Call [phone] to schedule an appointment. You may also contact the HF RNs at [email or web address]     Thank you for coming to your appointment today. If you have any questions or need cardiac medication refills, please call the Heart Failure Office at [phone] option [phone].   You may also contact the HF RNs at [email or web address]      No medication changes

## 2024-03-12 NOTE — PROGRESS NOTES
Advanced Heart Failure and Cardiac Transplantation Cardiology    Archie Pedersen is a 83 y.o. male from [employer], retired , here for routine visit per trial protocol. Since I last saw him a month ago he has been well. He has ongoing modest dyspnea on exertion and fatigue.     Exam: /77 (BP Location: Right arm, Patient Position: Sitting, BP Cuff Size: Adult)   Pulse 88   Temp 36.6 °C (97.9 °F) (Temporal)   Wt 75.9 kg (167 lb 6.4 oz)   BMI 27.02 kg/m²   No JVD on exam today  Irregular pulse  CTA  No LE edema    Current Outpatient Medications   Medication Instructions    atorvastatin (LIPITOR) 20 mg, oral, Daily    clindamycin (Cleocin T) 1 % lotion Topical, 2 times daily    Eliquis 5 mg, oral, 2 times daily    furosemide (LASIX) 20 mg, oral, Daily    KRILL OIL ORAL oral    levothyroxine (SYNTHROID, LEVOXYL) 50 mcg, oral, Daily    magnesium oxide (MAG-OX) 250 mg    propranolol (Inderal) 20 mg tablet 1.5 tablets, oral, 2 times daily    saw palmetto 160 mg capsule oral, Every 12 hours    spironolactone (ALDACTONE) 25 mg, oral, Daily    tamsulosin (FLOMAX) 0.4 mg, oral, Daily    triamcinolone (Kenalog) 0.1 % cream Topical, 2 times daily, Apply to affected area 1-2 times daily as needed. Avoid face and groin.    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 Enrolled in [employer]. Stable at this time, with NYHA class II symptoms.    Plan:  -- Continue tafamidis and clinical trial drug (eplontersen vs. Placebo)  -- Routine follow-up per study protocol (May 2024)    Malachi Garcia MD, MPH  Advanced Heart Failure and Transplant Cardiology  [facility]  [facility]

## 2024-03-13 DIAGNOSIS — I10 HYPERTENSION, UNSPECIFIED TYPE: ICD-10-CM

## 2024-03-13 DIAGNOSIS — E03.9 HYPOTHYROIDISM, UNSPECIFIED TYPE: ICD-10-CM

## 2024-03-14 ENCOUNTER — PHARMACY VISIT (OUTPATIENT)
Dept: PHARMACY | Facility: CLINIC | Age: 84
End: 2024-03-14
Payer: MEDICARE

## 2024-03-14 RX ORDER — SPIRONOLACTONE 25 MG/1
25 TABLET ORAL DAILY
Qty: 90 TABLET | Refills: 3 | Status: SHIPPED | OUTPATIENT
Start: 2024-03-14

## 2024-03-14 RX ORDER — LEVOTHYROXINE SODIUM 50 UG/1
50 TABLET ORAL DAILY
Qty: 90 TABLET | Refills: 3 | Status: SHIPPED | OUTPATIENT
Start: 2024-03-14

## 2024-03-19 DIAGNOSIS — E85.4 CARDIAC AMYLOIDOSIS (MULTI): ICD-10-CM

## 2024-03-19 DIAGNOSIS — I43 CARDIAC AMYLOIDOSIS (MULTI): ICD-10-CM

## 2024-03-21 ENCOUNTER — NURSE ONLY (OUTPATIENT)
Dept: RESEARCH | Age: 84
End: 2024-03-21

## 2024-04-04 ENCOUNTER — SPECIALTY PHARMACY (OUTPATIENT)
Dept: PHARMACY | Facility: CLINIC | Age: 84
End: 2024-04-04

## 2024-04-04 PROCEDURE — RXMED WILLOW AMBULATORY MEDICATION CHARGE

## 2024-04-09 DIAGNOSIS — E85.4 CARDIAC AMYLOIDOSIS (MULTI): Primary | ICD-10-CM

## 2024-04-09 DIAGNOSIS — I43 CARDIAC AMYLOIDOSIS (MULTI): Primary | ICD-10-CM

## 2024-04-11 ENCOUNTER — NURSE ONLY (OUTPATIENT)
Dept: RESEARCH | Age: 84
End: 2024-04-11

## 2024-04-11 ENCOUNTER — PHARMACY VISIT (OUTPATIENT)
Dept: PHARMACY | Facility: CLINIC | Age: 84
End: 2024-04-11
Payer: MEDICARE

## 2024-05-08 DIAGNOSIS — E85.4 CARDIAC AMYLOIDOSIS (MULTI): ICD-10-CM

## 2024-05-08 DIAGNOSIS — I43 CARDIAC AMYLOIDOSIS (MULTI): ICD-10-CM

## 2024-05-08 PROCEDURE — RXMED WILLOW AMBULATORY MEDICATION CHARGE

## 2024-05-09 ENCOUNTER — PHARMACY VISIT (OUTPATIENT)
Dept: PHARMACY | Facility: CLINIC | Age: 84
End: 2024-05-09
Payer: MEDICARE

## 2024-05-09 ENCOUNTER — NURSE ONLY (OUTPATIENT)
Dept: RESEARCH | Age: 84
End: 2024-05-09

## 2024-05-10 ENCOUNTER — TELEMEDICINE (OUTPATIENT)
Dept: PHARMACY | Facility: HOSPITAL | Age: 84
End: 2024-05-10
Payer: MEDICARE

## 2024-05-10 DIAGNOSIS — I48.92 ATRIAL FIBRILLATION AND FLUTTER (MULTI): ICD-10-CM

## 2024-05-10 DIAGNOSIS — I48.91 ATRIAL FIBRILLATION, UNSPECIFIED TYPE (MULTI): ICD-10-CM

## 2024-05-10 DIAGNOSIS — I48.91 ATRIAL FIBRILLATION AND FLUTTER (MULTI): ICD-10-CM

## 2024-05-10 NOTE — PROGRESS NOTES
"Pharmacist Clinic: Anticoagulation Management  Archie Pedersen was referred to the Clinical Pharmacy Team for his anticoagulation management.    Referring Provider:  FRANCISCO JAVIER Brito    Last Appointment w/ Pharmacist: 2/9/2024  Pharmacist Name: Samantha Mendoza  _______________________________________________________________________  PHARMACY ASSESSMENT    Review of Past Appointment:   -  PAP is approved for Eliquis until 12/7/2024    RELEVANT LAB RESULTS  Lab Results   Component Value Date    BILITOT 0.9 12/27/2023    CALCIUM 10.6 12/27/2023    CO2 31 12/27/2023     12/27/2023    CREATININE 1.36 (H) 12/27/2023    GLUCOSE 98 12/27/2023    ALKPHOS 96 12/27/2023    K 5.2 12/27/2023    PROT 7.1 12/27/2023     12/27/2023    AST 24 12/27/2023    ALT 14 12/27/2023    BUN 33 (H) 12/27/2023    ANIONGAP 13 12/27/2023    MG 2.00 12/16/2020    PHOS 4.1 10/04/2021    ALBUMIN 4.3 12/27/2023    GFRMALE 53 (A) 06/30/2023     Lab Results   Component Value Date    TRIG 115 12/27/2023    CHOL 125 12/27/2023    LDLCALC 49 12/27/2023    HDL 52.8 12/27/2023     No results found for: \"BMCBC\", \"CBCDIF\"   _______________________________________________________________________  ANTICOAGULATION ASSESSMENT    The ASCVD Risk score (Nik VILLALPANDO, et al., 2019) failed to calculate for the following reasons:    The 2019 ASCVD risk score is only valid for ages 40 to 79    The patient has a prior MI or stroke diagnosis    DIAGNOSIS: prevention of nonvalvular atrial fibrilliation stroke and systemic embolism  - Patient is projected to be on anticoagulation long term  - [id number] Score: [3] (only included if diagnosis is atrial fibrillation)   Age: [<65 (0)] [65-74 (+1)] [> 75 (+2)]: 2  Sex: [Male/Female (+1)]: 0  CHF history: [No/Yes(+1)]: 1  Hypertension history: [No/Yes(+1)]: 0  Stroke/TIA/thromboembolism history: [No/Yes(+2)]: 0  Vascular disease history (prior MI, peripheral artery disease, aortic plaque): [No/Yes(+1)]: " 0  Diabetes history: [No/Yes(+1)]: 0    CURRENT PHARMACOTHERAPY:   - Eliquis 5 mg BID   - Age: 84 years old   - Weight: 75.9 kg (167 lbs) as of 3/12/2024   - Scr: 1.36 as of 12/27/2023    UPDATE ON PHARMACOTHERAPY:   Affordability/Accessibility: Eliquis approved for  PAP until 12/7/2024  Adherence/Organization: Taking twice a day as directed  Adverse Effects: None  Recent Hospitalizations: None  Recent Falls/Trauma: None  Changes in Tobacco or Alcohol Intake: N/A    DISCUSSION/NOTES:  - Patient has a past medical history of A fib, CHF, HLD, and cardiomyopathy. He is currently doing well on the Eliquis 5 mg BID and does not report any adverse effects.    _______________________________________________________________________  PATIENT EDUCATION/GOALS  - Counseled patient on MOA, expectations, duration of therapy, contraindications, administration, and monitoring parameters  - Counseled patient of side effects that are indicative of bleeding such as dark tarry stool, unexplainable bruising, or vomiting up a coffee ground like substance  - Answered all patient questions and concerns  _______________________________________________________________________  RECOMMENDATIONS/PLAN  1. Continue Eliquis 5 mg BID    Next Cardiology Appointment: 5/31/2024  Clinical Pharmacist follow up: 8/9/2024  VAF/Application Expiration: Yes    Date: 12/7/2024  Type of Encounter: Gunner Mendoza, PharmD    Verbal consent to manage patient's drug therapy was obtained from the patient . They were informed they may decline to participate or withdraw from participation in pharmacy services at any time.    Continue all meds under the continuation of care with the referring provider and clinical pharmacy team.

## 2024-05-10 NOTE — Clinical Note
Hi Shirley! Kamran continues on Eliquis 5 mg BID without an complications. Will continue to follow.

## 2024-05-22 DIAGNOSIS — I50.30 DIASTOLIC HEART FAILURE, STAGE C (MULTI): ICD-10-CM

## 2024-05-23 RX ORDER — FUROSEMIDE 20 MG/1
20 TABLET ORAL DAILY
Qty: 90 TABLET | Refills: 3 | Status: SHIPPED | OUTPATIENT
Start: 2024-05-23 | End: 2025-05-23

## 2024-05-31 ENCOUNTER — OFFICE VISIT (OUTPATIENT)
Dept: CARDIOLOGY | Facility: CLINIC | Age: 84
End: 2024-05-31
Payer: MEDICARE

## 2024-05-31 VITALS
HEART RATE: 92 BPM | BODY MASS INDEX: 26.68 KG/M2 | OXYGEN SATURATION: 95 % | DIASTOLIC BLOOD PRESSURE: 68 MMHG | TEMPERATURE: 98.1 F | SYSTOLIC BLOOD PRESSURE: 109 MMHG | HEIGHT: 66 IN | WEIGHT: 166 LBS

## 2024-05-31 DIAGNOSIS — E85.4 CARDIAC AMYLOIDOSIS (MULTI): Primary | ICD-10-CM

## 2024-05-31 DIAGNOSIS — I43 CARDIAC AMYLOIDOSIS (MULTI): Primary | ICD-10-CM

## 2024-05-31 NOTE — PROGRESS NOTES
"Advanced Heart Failure and Cardiac Transplantation Cardiology    Archie Pedersen is a 84 y.o. male from [facility], retired , here for routine visit per trial protocol.     He denies any worsening in his cardiac condition. He continues having dyspnea on exertion with more than usual activities (NYHA class II).    Since I last saw him a month ago he has been well. He has ongoing modest dyspnea on exertion and fatigue.     Exam: /68 (BP Location: Right arm, Patient Position: Sitting, BP Cuff Size: Adult)   Pulse 92   Temp 36.7 °C (98.1 °F) (Temporal)   Ht 1.676 m (5' 6\")   Wt 75.3 kg (166 lb)   SpO2 95%   BMI 26.79 kg/m²   No JVD   Irregularly irregular pulse  CTA  No LE edema    Current Outpatient Medications   Medication Instructions    atorvastatin (LIPITOR) 20 mg, oral, Daily    clindamycin (Cleocin T) 1 % lotion Topical, 2 times daily    Eliquis 5 mg, oral, 2 times daily    furosemide (LASIX) 20 mg, oral, Daily    KRILL OIL ORAL oral    levothyroxine (SYNTHROID, LEVOXYL) 50 mcg, oral, Daily    magnesium oxide (MAG-OX) 250 mg    propranolol (Inderal) 20 mg tablet 1.5 tablets, oral, 2 times daily    saw palmetto 160 mg capsule oral, Every 12 hours    spironolactone (ALDACTONE) 25 mg, oral, Daily    tamsulosin (FLOMAX) 0.4 mg, oral, Daily    triamcinolone (Kenalog) 0.1 % cream Topical, 2 times daily, Apply to affected area 1-2 times daily as needed. Avoid face and groin.    Vyndamax 61 mg, oral, Daily     Cardiovascular history:  -- wt-ATTR cardiac amyloidosis  -- Stage C HFpEF, NYHA class II  -- Atrial fibrillation, on DOAC    Assessment: 84 y.o. 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 Enrolled in CARDIO-ATTransform RCT. Stable at this time, with NYHA class II symptoms.    Plan:  -- Continue tafamidis and clinical trial drug (eplontersen vs. Placebo)  -- Routine follow-up per study protocol     Malachi Garcia MD, MPH  Advanced Heart Failure and Transplant Cardiology  [facility] " [facility]

## 2024-05-31 NOTE — PATIENT INSTRUCTIONS
To reach Dr. Garcia's office please call [phone] ([facility]). Fax [phone]. Call [phone] to schedule an appointment. You may also contact the HF RNs at [email or web address]    Thank you for coming to your appointment today. If you have any questions or need cardiac medication refills, please call the Heart Failure Office at [phone] option 6.     No medication changes   Lizeth will be in touch to set up a follow up

## 2024-06-01 DIAGNOSIS — E78.2 MIXED HYPERLIPIDEMIA: ICD-10-CM

## 2024-06-03 PROCEDURE — RXMED WILLOW AMBULATORY MEDICATION CHARGE

## 2024-06-03 RX ORDER — ATORVASTATIN CALCIUM 20 MG/1
20 TABLET, FILM COATED ORAL DAILY
Qty: 90 TABLET | Refills: 3 | Status: SHIPPED | OUTPATIENT
Start: 2024-06-03

## 2024-06-05 DIAGNOSIS — E85.89 OTHER AMYLOIDOSIS (MULTI): ICD-10-CM

## 2024-06-05 PROCEDURE — RXMED WILLOW AMBULATORY MEDICATION CHARGE

## 2024-06-06 ENCOUNTER — PHARMACY VISIT (OUTPATIENT)
Dept: PHARMACY | Facility: CLINIC | Age: 84
End: 2024-06-06
Payer: MEDICARE

## 2024-06-06 ENCOUNTER — NURSE ONLY (OUTPATIENT)
Dept: RESEARCH | Age: 84
End: 2024-06-06

## 2024-06-06 ENCOUNTER — HOSPITAL ENCOUNTER (OUTPATIENT)
Dept: CARDIOLOGY | Facility: HOSPITAL | Age: 84
Discharge: HOME | End: 2024-06-06
Payer: MEDICARE

## 2024-06-06 DIAGNOSIS — E85.4 CARDIAC AMYLOIDOSIS (MULTI): ICD-10-CM

## 2024-06-06 DIAGNOSIS — I43 CARDIAC AMYLOIDOSIS (MULTI): ICD-10-CM

## 2024-06-07 ENCOUNTER — PHARMACY VISIT (OUTPATIENT)
Dept: PHARMACY | Facility: CLINIC | Age: 84
End: 2024-06-07
Payer: MEDICARE

## 2024-06-25 DIAGNOSIS — I43 CARDIAC AMYLOIDOSIS (MULTI): ICD-10-CM

## 2024-06-25 DIAGNOSIS — E85.4 CARDIAC AMYLOIDOSIS (MULTI): ICD-10-CM

## 2024-06-28 ENCOUNTER — NURSE ONLY (OUTPATIENT)
Dept: RESEARCH | Age: 84
End: 2024-06-28

## 2024-07-01 ENCOUNTER — LAB (OUTPATIENT)
Dept: LAB | Facility: LAB | Age: 84
End: 2024-07-01
Payer: MEDICARE

## 2024-07-01 DIAGNOSIS — R73.03 PREDIABETES: ICD-10-CM

## 2024-07-01 DIAGNOSIS — E78.2 MIXED HYPERLIPIDEMIA: ICD-10-CM

## 2024-07-01 DIAGNOSIS — E03.8 OTHER SPECIFIED HYPOTHYROIDISM: ICD-10-CM

## 2024-07-01 LAB
ALBUMIN SERPL BCP-MCNC: 4.3 G/DL (ref 3.4–5)
ALP SERPL-CCNC: 89 U/L (ref 33–136)
ALT SERPL W P-5'-P-CCNC: 12 U/L (ref 10–52)
ANION GAP SERPL CALC-SCNC: 12 MMOL/L (ref 10–20)
AST SERPL W P-5'-P-CCNC: 23 U/L (ref 9–39)
BASOPHILS # BLD AUTO: 0.04 X10*3/UL (ref 0–0.1)
BASOPHILS NFR BLD AUTO: 0.6 %
BILIRUB SERPL-MCNC: 0.9 MG/DL (ref 0–1.2)
BUN SERPL-MCNC: 32 MG/DL (ref 6–23)
CALCIUM SERPL-MCNC: 9.9 MG/DL (ref 8.6–10.6)
CHLORIDE SERPL-SCNC: 101 MMOL/L (ref 98–107)
CHOLEST SERPL-MCNC: 108 MG/DL (ref 0–199)
CHOLESTEROL/HDL RATIO: 2.4
CO2 SERPL-SCNC: 30 MMOL/L (ref 21–32)
CREAT SERPL-MCNC: 1.3 MG/DL (ref 0.5–1.3)
EGFRCR SERPLBLD CKD-EPI 2021: 55 ML/MIN/1.73M*2
EOSINOPHIL # BLD AUTO: 0.22 X10*3/UL (ref 0–0.4)
EOSINOPHIL NFR BLD AUTO: 3.6 %
ERYTHROCYTE [DISTWIDTH] IN BLOOD BY AUTOMATED COUNT: 13.7 % (ref 11.5–14.5)
EST. AVERAGE GLUCOSE BLD GHB EST-MCNC: 120 MG/DL
GLUCOSE SERPL-MCNC: 96 MG/DL (ref 74–99)
HBA1C MFR BLD: 5.8 %
HCT VFR BLD AUTO: 46 % (ref 41–52)
HDLC SERPL-MCNC: 45.2 MG/DL
HGB BLD-MCNC: 14.8 G/DL (ref 13.5–17.5)
IMM GRANULOCYTES # BLD AUTO: 0.03 X10*3/UL (ref 0–0.5)
IMM GRANULOCYTES NFR BLD AUTO: 0.5 % (ref 0–0.9)
LDLC SERPL CALC-MCNC: 45 MG/DL
LYMPHOCYTES # BLD AUTO: 1.25 X10*3/UL (ref 0.8–3)
LYMPHOCYTES NFR BLD AUTO: 20.3 %
MCH RBC QN AUTO: 30.6 PG (ref 26–34)
MCHC RBC AUTO-ENTMCNC: 32.2 G/DL (ref 32–36)
MCV RBC AUTO: 95 FL (ref 80–100)
MONOCYTES # BLD AUTO: 0.69 X10*3/UL (ref 0.05–0.8)
MONOCYTES NFR BLD AUTO: 11.2 %
NEUTROPHILS # BLD AUTO: 3.93 X10*3/UL (ref 1.6–5.5)
NEUTROPHILS NFR BLD AUTO: 63.8 %
NON HDL CHOLESTEROL: 63 MG/DL (ref 0–149)
NRBC BLD-RTO: 0 /100 WBCS (ref 0–0)
PLATELET # BLD AUTO: 240 X10*3/UL (ref 150–450)
POTASSIUM SERPL-SCNC: 4.9 MMOL/L (ref 3.5–5.3)
PROT SERPL-MCNC: 6.8 G/DL (ref 6.4–8.2)
RBC # BLD AUTO: 4.83 X10*6/UL (ref 4.5–5.9)
SODIUM SERPL-SCNC: 138 MMOL/L (ref 136–145)
TRIGL SERPL-MCNC: 89 MG/DL (ref 0–149)
TSH SERPL-ACNC: 2.54 MIU/L (ref 0.44–3.98)
VLDL: 18 MG/DL (ref 0–40)
WBC # BLD AUTO: 6.2 X10*3/UL (ref 4.4–11.3)

## 2024-07-01 PROCEDURE — 93005 ELECTROCARDIOGRAM TRACING: CPT

## 2024-07-03 LAB
ATRIAL RATE: 117 BPM
Q ONSET: 204 MS
QRS COUNT: 13 BEATS
QRS DURATION: 132 MS
QT INTERVAL: 420 MS
QTC CALCULATION(BAZETT): 493 MS
QTC FREDERICIA: 468 MS
R AXIS: 162 DEGREES
T AXIS: 37 DEGREES
T OFFSET: 414 MS
VENTRICULAR RATE: 83 BPM

## 2024-07-08 ENCOUNTER — APPOINTMENT (OUTPATIENT)
Dept: PRIMARY CARE | Facility: CLINIC | Age: 84
End: 2024-07-08
Payer: MEDICARE

## 2024-07-08 VITALS
WEIGHT: 166.7 LBS | HEART RATE: 91 BPM | DIASTOLIC BLOOD PRESSURE: 74 MMHG | SYSTOLIC BLOOD PRESSURE: 120 MMHG | OXYGEN SATURATION: 96 % | RESPIRATION RATE: 16 BRPM | HEIGHT: 66 IN | BODY MASS INDEX: 26.79 KG/M2

## 2024-07-08 DIAGNOSIS — I48.0 PAROXYSMAL ATRIAL FIBRILLATION (MULTI): ICD-10-CM

## 2024-07-08 DIAGNOSIS — M17.11 OSTEOARTHRITIS OF RIGHT KNEE, UNSPECIFIED OSTEOARTHRITIS TYPE: ICD-10-CM

## 2024-07-08 DIAGNOSIS — G25.0 ESSENTIAL TREMOR: ICD-10-CM

## 2024-07-08 DIAGNOSIS — E03.8 OTHER SPECIFIED HYPOTHYROIDISM: ICD-10-CM

## 2024-07-08 DIAGNOSIS — R73.03 PREDIABETES: ICD-10-CM

## 2024-07-08 DIAGNOSIS — I83.93 VARICOSE VEINS OF BOTH LOWER EXTREMITIES, UNSPECIFIED WHETHER COMPLICATED: ICD-10-CM

## 2024-07-08 DIAGNOSIS — N40.1 BENIGN LOCALIZED PROSTATIC HYPERPLASIA WITH LOWER URINARY TRACT SYMPTOMS (LUTS): ICD-10-CM

## 2024-07-08 DIAGNOSIS — N18.30 CHRONIC KIDNEY DISEASE (CKD), ACTIVE MEDICAL MANAGEMENT WITHOUT DIALYSIS, STAGE 3 (MODERATE) (MULTI): ICD-10-CM

## 2024-07-08 DIAGNOSIS — Z00.00 ENCOUNTER FOR ANNUAL WELLNESS EXAM IN MEDICARE PATIENT: Primary | ICD-10-CM

## 2024-07-08 DIAGNOSIS — E78.5 HYPERLIPIDEMIA, UNSPECIFIED HYPERLIPIDEMIA TYPE: ICD-10-CM

## 2024-07-08 DIAGNOSIS — I10 HYPERTENSION, UNSPECIFIED TYPE: ICD-10-CM

## 2024-07-08 ASSESSMENT — PATIENT HEALTH QUESTIONNAIRE - PHQ9
5. POOR APPETITE OR OVEREATING: NOT AT ALL
1. LITTLE INTEREST OR PLEASURE IN DOING THINGS: NOT AT ALL
9. THOUGHTS THAT YOU WOULD BE BETTER OFF DEAD, OR OF HURTING YOURSELF: NOT AT ALL
4. FEELING TIRED OR HAVING LITTLE ENERGY: NEARLY EVERY DAY
SUM OF ALL RESPONSES TO PHQ QUESTIONS 1-9: 6
2. FEELING DOWN, DEPRESSED OR HOPELESS: NOT AT ALL
8. MOVING OR SPEAKING SO SLOWLY THAT OTHER PEOPLE COULD HAVE NOTICED. OR THE OPPOSITE, BEING SO FIGETY OR RESTLESS THAT YOU HAVE BEEN MOVING AROUND A LOT MORE THAN USUAL: NOT AT ALL
3. TROUBLE FALLING OR STAYING ASLEEP OR SLEEPING TOO MUCH: NEARLY EVERY DAY
SUM OF ALL RESPONSES TO PHQ9 QUESTIONS 1 AND 2: 0
10. IF YOU CHECKED OFF ANY PROBLEMS, HOW DIFFICULT HAVE THESE PROBLEMS MADE IT FOR YOU TO DO YOUR WORK, TAKE CARE OF THINGS AT HOME, OR GET ALONG WITH OTHER PEOPLE: SOMEWHAT DIFFICULT
6. FEELING BAD ABOUT YOURSELF - OR THAT YOU ARE A FAILURE OR HAVE LET YOURSELF OR YOUR FAMILY DOWN: NOT AT ALL
7. TROUBLE CONCENTRATING ON THINGS, SUCH AS READING THE NEWSPAPER OR WATCHING TELEVISION: NOT AT ALL

## 2024-07-08 ASSESSMENT — ENCOUNTER SYMPTOMS
OCCASIONAL FEELINGS OF UNSTEADINESS: 0
DEPRESSION: 0
LOSS OF SENSATION IN FEET: 1

## 2024-07-08 ASSESSMENT — ACTIVITIES OF DAILY LIVING (ADL)
DRESSING: INDEPENDENT
MANAGING_FINANCES: INDEPENDENT
DOING_HOUSEWORK: INDEPENDENT
GROCERY_SHOPPING: INDEPENDENT
TAKING_MEDICATION: INDEPENDENT
BATHING: INDEPENDENT

## 2024-07-08 NOTE — PROGRESS NOTES
Subjective   Archie Pedersen is a 84 y.o. male who presents for Medicare Annual Wellness Visit Subsequent.  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 No numbness or tingling.     Lower leg redness, using steroid cream.     R knee abnml movement when standing. No weakness.     In study for amyloid  Current Outpatient Medications on File Prior to Visit   Medication Sig Dispense Refill    apixaban (Eliquis) 5 mg tablet Take 1 tablet (5 mg) by mouth 2 times a day. 180 tablet 3    atorvastatin (Lipitor) 20 mg tablet TAKE ONE TABLET BY MOUTH ONCE DAILY 90 tablet 3    clindamycin (Cleocin T) 1 % lotion Apply topically 2 times a day. 60 mL 11    furosemide (Lasix) 20 mg tablet Take 1 tablet (20 mg) by mouth once daily. 90 tablet 3    KRILL OIL ORAL Take by mouth.      levothyroxine (Synthroid, Levoxyl) 50 mcg tablet TAKE ONE TABLET BY MOUTH DAILY 90 tablet 3    magnesium oxide (Mag-Ox) 250 mg magnesium tablet 1 tablet (250 mg).      propranolol (Inderal) 20 mg tablet Take 1.5 tablets (30 mg) by mouth 2 times a day.      saw palmetto 160 mg capsule Take by mouth every 12 hours.      spironolactone (Aldactone) 25 mg tablet TAKE ONE TABLET BY MOUTH EVERY DAY 90 tablet 3    tafamidis (Vyndamax) 61 mg capsule Take 1 capsule (61 mg) by mouth once daily. 30 capsule 11    tamsulosin (Flomax) 0.4 mg 24 hr capsule Take 1 capsule (0.4 mg) by mouth once daily. 90 capsule 3    triamcinolone (Kenalog) 0.1 % cream Apply topically 2 times a day. Apply to affected area 1-2 times daily as needed. Avoid face and groin. 80 g 1     Current Facility-Administered Medications on File Prior to Visit  "  Medication Dose Route Frequency Provider Last Rate Last Admin    Study [id number]/placebo syringe 45 mg  45 mg subcutaneous Once Malachi Garcia MD MPH        Study [id number]/placebo syringe 45 mg  45 mg subcutaneous Once Malachi Garcia MD MPH        Study [id number]/placebo syringe 45 mg  45 mg subcutaneous Once Malachi Garcia MD MPH        Study [id number]/placebo syringe 45 mg  45 mg subcutaneous Once Malachi Garcia MD MPH        Study [id number]/placebo syringe 45 mg  45 mg subcutaneous Once Malachi Garcia MD MPH        Study [id number]/placebo syringe 45 mg  45 mg subcutaneous Once Malachi Garcia MD MPH        Study [id number]/placebo syringe 45 mg  45 mg subcutaneous Once Malachi Garcia MD MPH        Study [id number]/placebo syringe 45 mg  45 mg subcutaneous Once Malachi Garcia MD MPH        Study [id number]/placebo syringe 45 mg  45 mg subcutaneous Once Malachi Garcia MD MPH        Study [id number]/placebo syringe 45 mg  45 mg subcutaneous Once Malachi Garcia MD MPH            Patient Health Questionnaire-9 Score: 6           Objective   /74 (BP Location: Right arm)   Pulse 91   Resp 16   Ht 1.676 m (5' 6\")   Wt 75.6 kg (166 lb 11.2 oz)   SpO2 96%   BMI 26.91 kg/m²    Physical Exam  General: NAD  HEENT:NCAT, PERRLA, nml OP, b/l TM clear   Neck: no cervical CASI  Heart: RRR no murmur, no edema   Lungs: CTA b/l, no wheeze or rhonchi   GI: abd soft, nontender, nondistended.   MSK: no c/c/e. nml gait   Neck cracking w movement  R knee crepitus and abnml lat movement   Skin: warm and dry  L lower leg erythema  Psych: cooperative, appropriate affect  Neuro: speech clear. A&Ox3  2/3 5 min objet recall   Assessment/Plan   Problem List Items Addressed This Visit       Benign localized prostatic hyperplasia with lower urinary tract " symptoms (LUTS)  -controlled w flomax  -check PSA w next set of labs       Essential tremor  Stable        Paroxysmal atrial fibrillation (Multi)  Stable on eliquis      Prediabetes  Mostly stable  Dec carbs/sweets      Hypertension  Controlled  Cont spironolactone       Varicose veins of both lower extremities  Causing venous stasis   RF Dr purvis  Start comp stockings  Dec steroid use      Other Visit Diagnoses       Encounter for annual wellness exam in Medicare patient    -  Primary  overall doing well. Discussed diet/ex changes  BMI: 26  VACC: reviewed, PNA/shingrix/rsv/tdap UTD   COLON CA SCRN: n/a  LABS: reviewed w pt at goal besides aforementioned   Prostate ca scn: PSA w labs in 6 mo     CKD stage 3: stable. No nephrotox     R knee OA: no pain or weakness. Monitor    Neck crepitus: asym. No intervention unless pain

## 2024-07-10 ENCOUNTER — TELEPHONE (OUTPATIENT)
Dept: PRIMARY CARE | Facility: CLINIC | Age: 84
End: 2024-07-10
Payer: MEDICARE

## 2024-07-10 NOTE — TELEPHONE ENCOUNTER
Called back and clarified concern of venous stasis not PAD although he likely has element of PAD given his CV Hx

## 2024-07-18 ENCOUNTER — PHARMACY VISIT (OUTPATIENT)
Dept: PHARMACY | Facility: CLINIC | Age: 84
End: 2024-07-18
Payer: MEDICARE

## 2024-07-18 ENCOUNTER — SPECIALTY PHARMACY (OUTPATIENT)
Dept: PHARMACY | Facility: CLINIC | Age: 84
End: 2024-07-18

## 2024-07-18 PROCEDURE — RXMED WILLOW AMBULATORY MEDICATION CHARGE

## 2024-07-19 DIAGNOSIS — G25.0 ESSENTIAL TREMOR: Primary | ICD-10-CM

## 2024-07-21 RX ORDER — PROPRANOLOL HYDROCHLORIDE 20 MG/1
30 TABLET ORAL 2 TIMES DAILY
Qty: 270 TABLET | Refills: 3 | Status: SHIPPED | OUTPATIENT
Start: 2024-07-21 | End: 2024-07-23 | Stop reason: SDUPTHER

## 2024-07-22 DIAGNOSIS — I43 CARDIAC AMYLOIDOSIS (MULTI): ICD-10-CM

## 2024-07-22 DIAGNOSIS — G25.0 ESSENTIAL TREMOR: ICD-10-CM

## 2024-07-22 DIAGNOSIS — E85.4 CARDIAC AMYLOIDOSIS (MULTI): ICD-10-CM

## 2024-07-23 ENCOUNTER — NURSE ONLY (OUTPATIENT)
Dept: RESEARCH | Age: 84
End: 2024-07-23

## 2024-07-23 ENCOUNTER — SPECIALTY PHARMACY (OUTPATIENT)
Dept: PHARMACY | Facility: CLINIC | Age: 84
End: 2024-07-23

## 2024-07-23 DIAGNOSIS — Z12.11 SCREENING FOR COLON CANCER: ICD-10-CM

## 2024-07-23 NOTE — TELEPHONE ENCOUNTER
Pt should let study team know  Since resolved no further intervention warranted.    I saw cologuard order proposed. Did he request this?

## 2024-07-23 NOTE — TELEPHONE ENCOUNTER
Patient's wife called back in stated that it was sent to wrong pharm needing it sent to [facility]

## 2024-07-23 NOTE — TELEPHONE ENCOUNTER
Wife called in stated patient was at new drug study,  b/l hand swollen started last night, noticed large red blotches as big as her hand, about a half hour were bumps was on arms, back, legs near groin, rubbed it with alcohol stopped the itching, wife rubbed benadryl gel, lumps were gone this morning, Advise?

## 2024-07-24 RX ORDER — PROPRANOLOL HYDROCHLORIDE 20 MG/1
30 TABLET ORAL 2 TIMES DAILY
Qty: 270 TABLET | Refills: 3 | Status: SHIPPED | OUTPATIENT
Start: 2024-07-24

## 2024-07-24 NOTE — TELEPHONE ENCOUNTER
Patient's wife Lyubov called in stated that they did not get the refill went over to pharm this morning. Called pharm verified they did not get Rx, spoke with pharmacist gave verbal, AM

## 2024-08-01 ENCOUNTER — APPOINTMENT (OUTPATIENT)
Dept: CARDIOLOGY | Facility: CLINIC | Age: 84
End: 2024-08-01
Payer: MEDICARE

## 2024-08-01 VITALS
SYSTOLIC BLOOD PRESSURE: 106 MMHG | TEMPERATURE: 97.2 F | WEIGHT: 164 LBS | DIASTOLIC BLOOD PRESSURE: 65 MMHG | OXYGEN SATURATION: 96 % | BODY MASS INDEX: 26.36 KG/M2 | HEIGHT: 66 IN | HEART RATE: 80 BPM

## 2024-08-01 DIAGNOSIS — I43 CARDIAC AMYLOIDOSIS (MULTI): Primary | ICD-10-CM

## 2024-08-01 DIAGNOSIS — E85.4 CARDIAC AMYLOIDOSIS (MULTI): Primary | ICD-10-CM

## 2024-08-01 NOTE — PATIENT INSTRUCTIONS
To reach Dr. Garcia's office please call [phone] ([facility]). Fax [phone]. Call [phone] to schedule an appointment. You may also contact the HF RNs at [email or web address]    Thank you for coming to your appointment today. If you have any questions or need cardiac medication refills, please call the Heart Failure Office at [phone] option 6.

## 2024-08-01 NOTE — PROGRESS NOTES
"Advanced Heart Failure and Cardiac Transplantation Cardiology    Archie Pedersen is a 84 y.o. male from [facility], retired , here for routine visit per trial protocol (CardioTTRansform). He is stable with only modest functional capacity limitations (NYHA class II).    Exam: /65 (BP Location: Right arm, Patient Position: Sitting, BP Cuff Size: Adult)   Pulse 80   Temp 36.2 °C (97.2 °F) (Temporal)   Ht 1.676 m (5' 6\")   Wt 74.4 kg (164 lb)   SpO2 96%   BMI 26.47 kg/m²   No JVD   RRR  CTA  No LE edema    Current Outpatient Medications   Medication Instructions    atorvastatin (LIPITOR) 20 mg, oral, Daily    clindamycin (Cleocin T) 1 % lotion Topical, 2 times daily    Eliquis 5 mg, oral, 2 times daily    furosemide (LASIX) 20 mg, oral, Daily    KRILL OIL ORAL oral    levothyroxine (SYNTHROID, LEVOXYL) 50 mcg, oral, Daily    magnesium oxide (MAG-OX) 250 mg    propranolol (INDERAL) 30 mg, oral, 2 times daily    saw palmetto 160 mg capsule oral, Every 12 hours    spironolactone (ALDACTONE) 25 mg, oral, Daily    tamsulosin (FLOMAX) 0.4 mg, oral, Daily    triamcinolone (Kenalog) 0.1 % cream Topical, 2 times daily, Apply to affected area 1-2 times daily as needed. Avoid face and groin.    Vyndamax 61 mg, oral, Daily     Cardiovascular history:  -- wt-ATTR cardiac amyloidosis  -- Stage C HFpEF, NYHA class II  -- Atrial fibrillation, on DOAC    Assessment: 84 y.o. 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 Enrolled in CARDIO-ATTransform RCT. Stable at this time, with NYHA class II symptoms.    Plan:  -- Continue tafamidis and clinical trial drug (eplontersen vs. Placebo)  -- Routine follow-up per study protocol     Malachi Garcia MD, MPH  Advanced Heart Failure and Transplant Cardiology  [facility]  [facility]    "

## 2024-08-05 ENCOUNTER — TELEMEDICINE CLINICAL SUPPORT (OUTPATIENT)
Dept: PHARMACY | Facility: HOSPITAL | Age: 84
End: 2024-08-05
Payer: MEDICARE

## 2024-08-05 DIAGNOSIS — E85.4 CARDIAC AMYLOIDOSIS (MULTI): ICD-10-CM

## 2024-08-05 DIAGNOSIS — I43 CARDIAC AMYLOIDOSIS (MULTI): ICD-10-CM

## 2024-08-05 LAB — NONINV COLON CA DNA+OCC BLD SCRN STL QL: NEGATIVE

## 2024-08-05 NOTE — PROGRESS NOTES
[facility] Clinical Note    Archie Pedersen is a 84 y.o. male, who is on the specialty pharmacy service for management of:  Cardiology Core.    Archie Pedersen is taking: Vyndamax 61mg by mouth once daily.    Archie and his wife, Lyubov were contacted on 8/5/2024 at 1:40 PM for a virtual pharmacy visit with encounter number [id number] from:   [facility]  [address]  [address]  [facility] [address]  Dept: [phone]  Dept Fax: [phone]  Loc: [phone]    Archie was offered a Telemedicine Video visit or Telephone visit.  Archie's wife consented to a telephone visit, which was performed.    The most recent encounter visit with the referring prescriber Malachi Garcia MD, MPH on 8/1/24 was reviewed.  Pharmacy will continue to collaborate in the care of this patient with the referring prescriber Malachi Garcia MD, MPH.    General Assessment  Per Archie's wife, Lyubov, Archie continues on Vyndamax as prescribed with no reported side effects, efficacy concerns, or adherence barriers at this time. She denied any questions or concerns for a pharmacist on patient's behalf. Goal of therapy is to continue to delay disease progression. Patient is also currently in a clinical trial, as noted in last office visit.    Impression/Plan:  Is patient high risk (potential patients:  pregnancy, geriatric, pediatric)?  Yes- geriatric  Is laboratory follow-up needed? As directed per provider  Is a clinical intervention needed? Not at this time  Next reassessment date? Approx. 7/5/25    Refer to the encounter summary report for documentation details about patient counseling and education.      Medication Adherence    The importance of adherence was discussed with the patient and they were advised to take the medication as prescribed by their provider. Patient was encouraged to call their physician's office if they have a question regarding  a missed dose.     QOL/Patient Satisfaction  Rate your quality of life on scale of 1-10: 8  Rate your satisfaction with  Specialty Pharmacy on scale of 1-10: 10 - Completely satisfied      Patient was advised to contact the pharmacy if there are any changes to their medication list, including prescriptions, OTC medications, herbal products, or supplements. Patient was advised of [facility]'s dispensing process, refill timeline, contact information ([phone]), and patient management follow up. Patient confirmed understanding of education conducted during assessment. All patient questions and concerns were addressed to the best of my ability. Patient was encouraged to contact the specialty pharmacy with any questions or concerns.    Confirmed follow-up outreaches are properly scheduled and reviewed goals of therapy with the patient.        Razia Dave, PharmD

## 2024-08-09 ENCOUNTER — APPOINTMENT (OUTPATIENT)
Dept: PHARMACY | Facility: HOSPITAL | Age: 84
End: 2024-08-09
Payer: MEDICARE

## 2024-08-09 DIAGNOSIS — I48.91 ATRIAL FIBRILLATION AND FLUTTER (MULTI): ICD-10-CM

## 2024-08-09 DIAGNOSIS — I48.91 ATRIAL FIBRILLATION, UNSPECIFIED TYPE (MULTI): ICD-10-CM

## 2024-08-09 DIAGNOSIS — I48.92 ATRIAL FIBRILLATION AND FLUTTER (MULTI): ICD-10-CM

## 2024-08-09 RX ORDER — BISMUTH SUBSALICYLATE 262 MG
1 TABLET,CHEWABLE ORAL DAILY
COMMUNITY

## 2024-08-09 NOTE — PROGRESS NOTES
Pharmacist Clinic: Cardiology Management    Archie Pedersen is a 84 y.o. male was referred to Clinical Pharmacy Team for anticoagulation management.     Referring Provider: Shirley Jenkins, APRN-CNP    THIS IS A FOLLOW UP PATIENT APPOINTMENT. AT LAST VISIT ON 5/10/2024 WITH PHARMACIST (Samantha Mendoza).    Appointment was completed by the patient who was reached at .    REVIEW OF PAST APPNT (IF APPLICABLE):   Last pharmacy appointment was 3 month follow up regarding anticoagulation therapy with Eliquis. Patient was doing well at that time, with no concerns regarding adverse effects, hospitalizations, or falls.    Allergies Reviewed? Yes    Allergies   Allergen Reactions    Penicillins Unknown       Past Medical History:   Diagnosis Date    Calculus of kidney 02/25/2022    Left nephrolithiasis    Personal history of diseases of the skin and subcutaneous tissue 11/10/2014    History of seborrheic keratosis    Personal history of other benign neoplasm 10/13/2017    History of other benign neoplasm    Personal history of other diseases of the circulatory system 12/18/2020    History of atrial flutter    Personal history of other malignant neoplasm of skin     History of squamous cell carcinoma of skin    Personal history of urinary calculi 10/04/2019    History of renal calculi       Current Outpatient Medications on File Prior to Visit   Medication Sig Dispense Refill    apixaban (Eliquis) 5 mg tablet Take 1 tablet (5 mg) by mouth 2 times a day. 180 tablet 3    atorvastatin (Lipitor) 20 mg tablet TAKE ONE TABLET BY MOUTH ONCE DAILY 90 tablet 3    furosemide (Lasix) 20 mg tablet Take 1 tablet (20 mg) by mouth once daily. 90 tablet 3    KRILL OIL ORAL Take 500 mg by mouth once daily.      levothyroxine (Synthroid, Levoxyl) 50 mcg tablet TAKE ONE TABLET BY MOUTH DAILY 90 tablet 3    magnesium oxide (Mag-Ox) 250 mg magnesium tablet Take 1 tablet (250 mg) by mouth once daily.      multivitamin tablet Take 1 tablet  by mouth once daily.      propranolol (Inderal) 20 mg tablet Take 1.5 tablets (30 mg) by mouth 2 times a day. 270 tablet 3    saw palmetto 500 mg capsule Take 1 capsule (500 mg) by mouth once daily.      spironolactone (Aldactone) 25 mg tablet TAKE ONE TABLET BY MOUTH EVERY DAY 90 tablet 3    tafamidis (Vyndamax) 61 mg capsule Take 1 capsule (61 mg) by mouth once daily. 30 capsule 5    tamsulosin (Flomax) 0.4 mg 24 hr capsule Take 1 capsule (0.4 mg) by mouth once daily. 90 capsule 3    clindamycin (Cleocin T) 1 % lotion Apply topically 2 times a day. (Patient not taking: Reported on 8/1/2024) 60 mL 11    triamcinolone (Kenalog) 0.1 % cream Apply topically 2 times a day. Apply to affected area 1-2 times daily as needed. Avoid face and groin. (Patient not taking: Reported on 8/1/2024) 80 g 1    [DISCONTINUED] tafamidis (Vyndamax) 61 mg capsule Take 1 capsule (61 mg) by mouth once daily. 30 capsule 11     Current Facility-Administered Medications on File Prior to Visit   Medication Dose Route Frequency Provider Last Rate Last Admin    Study [id number]/placebo syringe 45 mg  45 mg subcutaneous Once Malachi Garcia MD MPH        Study [id number]/placebo syringe 45 mg  45 mg subcutaneous Once Malachi Garcia MD MPH        Study [id number]/placebo syringe 45 mg  45 mg subcutaneous Once Malachi Garcia MD MPH        Study [id number]/placebo syringe 45 mg  45 mg subcutaneous Once Malachi Garcia MD MPH        Study [id number]/placebo syringe 45 mg  45 mg subcutaneous Once Malachi Garcia MD MPH        Study [id number]/placebo syringe 45 mg  45 mg subcutaneous Once Malachi Garcia MD MPH        Study [id number]/placebo syringe 45 mg  45 mg subcutaneous Once Malachi Garcia MD MPH        Study [id number]/placebo syringe 45 mg  45 mg subcutaneous Once Malachi Garcia MD MPH        Study [id number]  "[id number]/placebo syringe 45 mg  45 mg subcutaneous Once Malachi Garcia MD MPH        Study [id number]/placebo syringe 45 mg  45 mg subcutaneous Once Malachi Garcia MD MPH        Study [id number]/placebo syringe 45 mg  45 mg subcutaneous Once Malachi Garcia MD MPH             RELEVANT LAB RESULTS  Lab Results   Component Value Date    BILITOT 0.9 07/01/2024    CALCIUM 9.9 07/01/2024    CO2 30 07/01/2024     07/01/2024    CREATININE 1.30 07/01/2024    GLUCOSE 96 07/01/2024    ALKPHOS 89 07/01/2024    K 4.9 07/01/2024    PROT 6.8 07/01/2024     07/01/2024    AST 23 07/01/2024    ALT 12 07/01/2024    BUN 32 (H) 07/01/2024    ANIONGAP 12 07/01/2024    MG 2.00 12/16/2020    PHOS 4.1 10/04/2021    ALBUMIN 4.3 07/01/2024    GFRMALE 53 (A) 06/30/2023     Lab Results   Component Value Date    TRIG 89 07/01/2024    CHOL 108 07/01/2024    LDLCALC 45 07/01/2024    HDL 45.2 07/01/2024     No results found for: \"BMCBC\", \"CBCDIF\"     PHARMACEUTICAL ASSESSMENT    MEDICATION RECONCILIATION    Home Pharmacy Reviewed? Yes, describe:  [facility]  or [facility]    Added:  - Multivitamin: 1 tablet by mouth once daily    Changed:  - Magnesium 250 mg: patient taking 1 tablet by mouth daily  - Saw palmetto: patient taking 1 tablet daily of 500 mg (previously listed 160 mg BID)  - Krill Oil: patient states taking 500 mg once daily      Medication Documentation Review Audit       Reviewed by Marybeth Morales, PharmD (Pharmacist) on 08/09/24 at 1344      Medication Order Taking? Sig Documenting Provider Last Dose Status   apixaban (Eliquis) 5 mg tablet [id number] Yes Take 1 tablet (5 mg) by mouth 2 times a day. Shirley Jenkins, APRN-CNP Taking Active   atorvastatin (Lipitor) 20 mg tablet [id number] Yes TAKE ONE TABLET BY MOUTH ONCE DAILY Sharee Behm, DO Taking Active   clindamycin (Cleocin T) 1 % lotion [id number] No Apply topically 2 times a day.   Patient not taking: Reported on " 8/1/2024    Ye Frederick MD Not Taking Active   furosemide (Lasix) 20 mg tablet [id number] Yes Take 1 tablet (20 mg) by mouth once daily. Brittany Behm, DO Taking Active   KRILL OIL ORAL [id number] Yes Take 500 mg by mouth once daily. Historical Provider, MD Taking Active   levothyroxine (Synthroid, Levoxyl) 50 mcg tablet [id number] Yes TAKE ONE TABLET BY MOUTH DAILY Brittany Behm, DO Taking Active   magnesium oxide (Mag-Ox) 250 mg magnesium tablet [id number] Yes Take 1 tablet (250 mg) by mouth once daily. Historical Provider, MD Taking Active   multivitamin tablet [id number] Yes Take 1 tablet by mouth once daily. Historical Provider, MD Taking Active   propranolol (Inderal) 20 mg tablet [id number] Yes Take 1.5 tablets (30 mg) by mouth 2 times a day. Brittany Behm, DO Taking Active   saw palmetto 500 mg capsule [id number] Yes Take 1 capsule (500 mg) by mouth once daily. Historical Provider, MD Taking Active   spironolactone (Aldactone) 25 mg tablet [id number] Yes TAKE ONE TABLET BY MOUTH EVERY DAY Brittany Behm, DO Taking Active   Study [id number]/placebo syringe 45 mg [id number]   Malachi Garcia MD MPH  Active   Study [id number]/placebo syringe 45 mg [id number]   Malachi Garcia MD MPH  Active   Study [id number]/placebo syringe 45 mg [id number]   Malachi Garcia MD MPH  Active   Study [id number]/placebo syringe 45 mg [id number]   Malachi Garcia MD MPH  Active   Study [id number]/placebo syringe 45 mg [id number]   Malachi Garcia MD MPH  Active   Study [id number]/placebo syringe 45 mg [id number]   Malachi Garcia MD MPH  Active   Study [id number]/placebo syringe 45 mg [id number]   Malachi Garcia MD MPH  Active   Study [id number]/placebo syringe 45 mg [id number]   Malachi Garcia MD MPH  Active   Study [id number]/placebo syringe 45 mg [id number]   Malachi Garcia MD MPH  Active   Study  [id number]/placebo syringe 45 mg [id number]   Malachi Garcia MD MPH  Active   Study [id number]/placebo syringe 45 mg [id number]   Malachi Garcia MD MPH  Active     Discontinued 08/05/24 1352   tafamidis (Vyndamax) 61 mg capsule [id number] Yes Take 1 capsule (61 mg) by mouth once daily. Malachi Garcia MD MPH Taking Active   tamsulosin (Flomax) 0.4 mg 24 hr capsule [id number] Yes Take 1 capsule (0.4 mg) by mouth once daily. Brittany Behm, DO Taking Active   triamcinolone (Kenalog) 0.1 % cream [id number] No Apply topically 2 times a day. Apply to affected area 1-2 times daily as needed. Avoid face and groin.   Patient not taking: Reported on 8/1/2024    Brittany Behm, DO Not Taking Active                    DISEASE MANAGEMENT ASSESSMENT:     ANTICOAGULATION ASSESSMENT    The ASCVD Risk score (Nik VILLALPANDO, et al., 2019) failed to calculate for the following reasons:    The 2019 ASCVD risk score is only valid for ages 40 to 79    The patient has a prior MI or stroke diagnosis    DIAGNOSIS: prevention of nonvalvular atrial fibrilliation stroke and systemic embolism  - Patient is projected to be on anticoagulation long term  - [id number] Score: [3] (only included if diagnosis is atrial fibrillation)   Age: [<65 (0)] [65-74 (+1)] [> 75 (+2)]: 2  Sex: [Male/Female (+1)]: 0  CHF history: [No/Yes(+1)]: 1  Hypertension history: [No/Yes(+1)]: 0  Stroke/TIA/thromboembolism history: [No/Yes(+2)]: 0  Vascular disease history (prior MI, peripheral artery disease, aortic plaque): [No/Yes(+1)]: 0  Diabetes history: [No/Yes(+1)]: 0    CURRENT PHARMACOTHERAPY:   Eliquis 5 mg BID  Dosage is appropriate for patient with the following criteria:  Age 84 years  Weight 74.4 kg  Scr 1.30 mg/dL (7/1/2024)    RELEVANT PAST MEDICAL HISTORY:   A-fib, cardiac amyloidosis, cardiomyopathy, hyperlipidemia, HTN, prediabetes    Affordability/Accessibility:  PAP for Eliquis - active through  12/7/2024  Adherence/Organization: confirms taking twice daily; denies any missed doses  Adverse Reactions: none; patient denies any bruising or bleeding  Recent Hospitalizations: none  Recent Falls/Trauma: none  Changes in Tobacco or Alcohol Intake:   Tobacco: none; patient does not use  Alcohol: none; patient does not use    EDUCATION/COUNSELING:   - Counseled patient on MOA, expectations, duration of therapy, contraindications, administration, and monitoring parameters  - Counseled patient of side effects that are indicative of bleeding such as dark tarry stool, unexplainable bruising, or vomiting up a coffee ground like substance      DISCUSSION/NOTES:   Today's appointment was 3 month follow up regarding anticoagulation therapy with Eliquis. Patient states he is doing well, denies any missed doses, adverse effects, hospitalizations, or falls. Completed a medication reconciliation with patient today as well. Discussed [facility] PAP renewal which will need to be completed prior to 12/7/2024. Will complete at next appointment in 3 months.     ASSESSMENT AND PLAN:    Assessment/Plan   Problem List Items Addressed This Visit       Atrial fibrillation and flutter (Multi)    Relevant Orders    Follow Up In Clinical Pharmacy     Other Visit Diagnoses       Atrial fibrillation, unspecified type (Multi)        Relevant Orders    Follow Up In Clinical Pharmacy              RECOMMENDATIONS/PLAN    Continue: Eliquis 5 mg BID  Follow up: 3 months    Next Cardiology Appointment: 11/13/2024  Clinical Pharmacist follow up: 11/7/2024  VAF/Application Expiration: Yes    Date: 12/7/2024  Type of Encounter: Gunner Morales PharmD    Verbal consent to manage patient's drug therapy was obtained from the patient . They were informed they may decline to participate or withdraw from participation in pharmacy services at any time.    Continue all meds under the continuation of care with the referring provider and clinical pharmacy  team.

## 2024-08-09 NOTE — Clinical Note
Kareen Watson, I spoke with Archie today regarding his Eliquis. Today's appointment was 3 month follow up. Patient states he is doing well, denies any missed doses, adverse effects, hospitalizations, or falls. Completed a medication reconciliation with patient today as well. Discussed  PAP renewal which will need to be completed prior to 12/7/2024. Will complete at next appointment in 3 months.

## 2024-08-12 ENCOUNTER — SPECIALTY PHARMACY (OUTPATIENT)
Dept: PHARMACY | Facility: CLINIC | Age: 84
End: 2024-08-12

## 2024-08-12 PROCEDURE — RXMED WILLOW AMBULATORY MEDICATION CHARGE

## 2024-08-15 ENCOUNTER — PHARMACY VISIT (OUTPATIENT)
Dept: PHARMACY | Facility: CLINIC | Age: 84
End: 2024-08-15
Payer: MEDICARE

## 2024-08-16 DIAGNOSIS — I43 CARDIAC AMYLOIDOSIS (MULTI): ICD-10-CM

## 2024-08-16 DIAGNOSIS — E85.4 CARDIAC AMYLOIDOSIS (MULTI): ICD-10-CM

## 2024-08-19 ENCOUNTER — NURSE ONLY (OUTPATIENT)
Dept: RESEARCH | Age: 84
End: 2024-08-19

## 2024-08-21 ENCOUNTER — APPOINTMENT (OUTPATIENT)
Dept: DERMATOLOGY | Facility: CLINIC | Age: 84
End: 2024-08-21
Payer: MEDICARE

## 2024-08-21 DIAGNOSIS — L82.0 INFLAMED SEBORRHEIC KERATOSIS: ICD-10-CM

## 2024-08-21 DIAGNOSIS — D48.5 NEOPLASM OF UNCERTAIN BEHAVIOR OF SKIN: Primary | ICD-10-CM

## 2024-08-21 DIAGNOSIS — L21.9 SEBORRHEIC DERMATITIS: ICD-10-CM

## 2024-08-21 DIAGNOSIS — D18.01 HEMANGIOMA OF SKIN: ICD-10-CM

## 2024-08-21 DIAGNOSIS — L57.0 ACTINIC KERATOSIS: ICD-10-CM

## 2024-08-21 DIAGNOSIS — L57.8 DIFFUSE PHOTODAMAGE OF SKIN: ICD-10-CM

## 2024-08-21 DIAGNOSIS — L82.1 SEBORRHEIC KERATOSIS: ICD-10-CM

## 2024-08-21 DIAGNOSIS — D22.5 MELANOCYTIC NEVUS OF TRUNK: ICD-10-CM

## 2024-08-21 DIAGNOSIS — Z85.828 HISTORY OF NONMELANOMA SKIN CANCER: ICD-10-CM

## 2024-08-21 RX ORDER — KETOCONAZOLE 20 MG/ML
SHAMPOO, SUSPENSION TOPICAL
Qty: 120 ML | Refills: 11 | Status: SHIPPED | OUTPATIENT
Start: 2024-08-21

## 2024-08-21 NOTE — PROGRESS NOTES
Subjective     Archie Pedersen is a 84 y.o. male who presents for the following: Skin Check.  He notes a brown, raised, rough bump on his left upper abdomen, which has been present for several months and has been itching recently.  It has not changed in any other way, including in size, shape, or color, and it does not hurt or bleed.  He denies any other new, changing, or concerning skin lesions since his last visit; no bleeding, itching, or burning lesions.      Review of Systems:  No other skin or systemic complaints other than what is documented elsewhere in the note.    The following portions of the chart were reviewed this encounter and updated as appropriate:       Skin Cancer History  No skin cancer on file.    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 Esquivel on 4/6/23  - BCC on left lateral lower cutaneous lip diagnosed on 2/8/22 s/p Mohs surgery by Dr. 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 Esquivel on 5/24/16 and on right ear and nose treated by an outside Dermatologist    - Aks    - folliculitis    - no h/o melanoma    Family History:    No family history of melanoma or skin cancer    Social History:    The patient and his wife live in [place]    Allergies:  Penicillins    Current Medications / CAM's:    Current Outpatient Medications:     apixaban (Eliquis) 5 mg tablet, Take 1 tablet (5 mg) by mouth 2 times a day., Disp: 180 tablet, Rfl: 3     atorvastatin (Lipitor) 20 mg tablet, TAKE ONE TABLET BY MOUTH ONCE DAILY, Disp: 90 tablet, Rfl: 3    clindamycin (Cleocin T) 1 % lotion, Apply topically 2 times a day. (Patient not taking: Reported on 8/1/2024), Disp: 60 mL, Rfl: 11    furosemide (Lasix) 20 mg tablet, Take 1 tablet (20 mg) by mouth once daily., Disp: 90 tablet, Rfl: 3    ketoconazole (NIZOral) 2 % shampoo, Wash affected areas of scalp 2-3 times weekly as directed, Disp: 120 mL, Rfl: 11    KRILL OIL ORAL, Take 500 mg by mouth once daily., Disp: , Rfl:     levothyroxine (Synthroid, Levoxyl) 50 mcg tablet, TAKE ONE TABLET BY MOUTH DAILY, Disp: 90 tablet, Rfl: 3    magnesium oxide (Mag-Ox) 250 mg magnesium tablet, Take 1 tablet (250 mg) by mouth once daily., Disp: , Rfl:     multivitamin tablet, Take 1 tablet by mouth once daily., Disp: , Rfl:     propranolol (Inderal) 20 mg tablet, Take 1.5 tablets (30 mg) by mouth 2 times a day., Disp: 270 tablet, Rfl: 3    saw palmetto 500 mg capsule, Take 1 capsule (500 mg) by mouth once daily., Disp: , Rfl:     spironolactone (Aldactone) 25 mg tablet, TAKE ONE TABLET BY MOUTH EVERY DAY, Disp: 90 tablet, Rfl: 3    tafamidis (Vyndamax) 61 mg capsule, Take 1 capsule (61 mg) by mouth once daily., Disp: 30 capsule, Rfl: 5    triamcinolone (Kenalog) 0.1 % cream, Apply topically 2 times a day. Apply to affected area 1-2 times daily as needed. Avoid face and groin. (Patient not taking: Reported on 8/1/2024), Disp: 80 g, Rfl: 1    Current Facility-Administered Medications:     Study [id number]/placebo syringe 45 mg, 45 mg, subcutaneous, Once, Malachi Garcia MD MPH    Study [id number]/placebo syringe 45 mg, 45 mg, subcutaneous, Once, Malachi Garcia MD MPH    Study [id number]/placebo syringe 45 mg, 45 mg, subcutaneous, Once, Malachi Garcia MD MPH    Study [id number]/placebo syringe 45 mg, 45 mg, subcutaneous, Once, Malachi Garcia MD MPH    Study [id number]  [id number]/placebo syringe 45 mg, 45 mg, subcutaneous, Once, Malachi Garica MD MPH    Study [id number]/placebo syringe 45 mg, 45 mg, subcutaneous, Once, Malachi Garcia MD MPH    Study [id number]/placebo syringe 45 mg, 45 mg, subcutaneous, Once, Malachi Garcia MD MPH    Study [id number]/placebo syringe 45 mg, 45 mg, subcutaneous, Once, Malachi Garcia MD MPH    Study [id number]/placebo syringe 45 mg, 45 mg, subcutaneous, Once, Malachi Garcia MD MPH    Study [id number]/placebo syringe 45 mg, 45 mg, subcutaneous, Once, Malachi Garcia MD MPH    Study [id number]/placebo syringe 45 mg, 45 mg, subcutaneous, Once, Malachi Garcia MD MPH    Study [id number]/placebo syringe 45 mg, 45 mg, subcutaneous, Once, Malachi Garcia MD MPH     Objective   Well appearing patient in no apparent distress; mood and affect are within normal limits.    A waist-up examination was performed including scalp, face, eyes, ears, nose, lips, neck, chest, axillae, abdomen, back, and bilateral upper extremities. All findings within normal limits unless otherwise noted below.        Assessment/Plan   1. Neoplasm of uncertain behavior of skin (2)  Left Anterior Lateral Inferior Neck  2 cm pink, scaly plaque          Lesion biopsy  Type of biopsy: tangential    Informed consent: discussed and consent obtained    Timeout: patient name, date of birth, surgical site, and procedure verified    Procedure prep:  Patient was prepped and draped  Anesthesia: the lesion was anesthetized in a standard fashion    Anesthetic:  1% lidocaine w/ epinephrine 1-100,000 local infiltration  Instrument used: flexible razor blade    Hemostasis achieved with: aluminum chloride    Outcome: patient tolerated procedure well    Post-procedure details: wound care instructions given      Staff Communication: Dermatology Local Anesthesia: 1 % Lidocaine / Epinephrine  - Amount:0.5ml    Specimen 1 - Dermatopathology- DERM LAB  Differential Diagnosis: SK v SCCIS v BCC  Check Margins Yes/No?:    Comments:    Dermpath Lab: Routine Histopathology (formalin-fixed tissue)    Left Proximal Ventral Forearm  1.2 cm dark brown pigmented, asymmetric patch with an asymmetric pigment network and irregular borders           Shave removal    Lesion length (cm):  1.2  Margin per side (cm):  0  Lesion diameter (cm):  1.2  Informed consent: discussed and consent obtained    Timeout: patient name, date of birth, surgical site, and procedure verified    Procedure prep:  Patient was prepped and draped  Anesthesia: the lesion was anesthetized in a standard fashion    Anesthetic:  1% lidocaine w/ epinephrine 1-100,000 local infiltration  Instrument used: flexible razor blade    Hemostasis achieved with: aluminum chloride    Outcome: patient tolerated procedure well    Post-procedure details: sterile dressing applied and wound care instructions given    Dressing type: bandage and petrolatum      Staff Communication: Dermatology Local Anesthesia: 1 % Lidocaine / Epinephrine - Amount:0.5ml    Specimen 2 - Dermatopathology- DERM LAB  Differential Diagnosis: SK v AMH v SCCIS  Check Margins Yes/No?:    Comments:    Dermpath Lab: Routine Histopathology (formalin-fixed tissue)    2. Inflamed seborrheic keratosis  Left Abdomen (side) - Upper  On the patient's left upper abdomen, there is a 1 cm erythematous and light brown-colored, hyperkeratotic, stuck-on appearing papule with a surrounding rim of erythema    Inflamed Seborrheic Keratosis -left upper abdomen.  The benign nature of this lesion was discussed with the patient today and reassurance provided.  Given the history the patient provides of frequent irritation and associated symptoms as well as its inflamed appearance on exam today, I offered to treat this lesion with liquid nitrogen cryotherapy.  The patient expressed understanding, is in agreement with  this plan, and wishes to proceed with cryotherapy today.    Destr of lesion - Left Abdomen (side) - Upper  Complexity: simple    Destruction method: cryotherapy    Informed consent: discussed and consent obtained    Lesion destroyed using liquid nitrogen: Yes    Cryotherapy cycles:  2  Outcome: patient tolerated procedure well with no complications    Post-procedure details: wound care instructions given      3. Actinic keratosis (17)  Head - Anterior (Face) (17)  Scattered on the patient's face and bilateral ears, there are multiple erythematous, gritty, scaly macules     Actinic Keratoses -scattered on face and bilateral ears.  The pre-cancerous nature of these lesions and treatment options were discussed with the patient today.  At this time, I recommend treatment with liquid nitrogen cryotherapy.  The patient expressed understanding, is in agreement with this plan, and wishes to proceed with cryotherapy today.    Destr of lesion - Head - Anterior (Face) (17)  Complexity: simple    Destruction method: cryotherapy    Informed consent: discussed and consent obtained    Lesion destroyed using liquid nitrogen: Yes    Cryotherapy cycles:  1  Outcome: patient tolerated procedure well with no complications    Post-procedure details: wound care instructions given      4. Melanocytic nevus of trunk  Scattered on the patient's face, neck, trunk, and bilateral upper extremities, there are several small, round- to oval-shaped, brown-pigmented and pink-colored, symmetric, uniform-appearing macules and dome-shaped papules    Clinically benign- to slightly atypical-appearing nevi - the clinically benign- to slightly atypical-appearing nature of the patient's nevi was discussed with the patient today.  None of the patient's nevi meet threshold for biopsy today.  I emphasized the importance of performing monthly self-skin exams using the ABCDs of monitoring moles, which were reviewed with the patient today and an informational  hand-out provided.  I also emphasized the importance of sun avoidance and sun protection with daily sunscreen use.  The patient expressed understanding and is in agreement with this plan.    5. Seborrheic keratosis  Scattered on the patient's face, neck, trunk, and bilateral upper extremities, there are multiple tan- to light brown-colored, hyperkeratotic, stuck-on appearing papules of varying size and shape    Seborrheic Keratoses - the benign nature of these lesions was discussed with the patient today and reassurance provided.  No treatment is medically indicated for the noninflamed SKs at this time.    6. Hemangioma of skin  Scattered on the patient's face, neck, trunk, and bilateral upper extremities, there are multiple small, round, cherry red- to purplish-colored, symmetric, uniform, vascular-appearing macules and papules    Cherry Angiomas - the benign nature of these vascular lesions was discussed with the patient today and reassurance provided.  No treatment is medically indicated for these lesions at this time.    7. Seborrheic dermatitis  Scalp  On the patient's scalp, there are pink, scaly patches with whitish-yellowish, greasy scale    Seborrheic Dermatitis - flare on scalp.  The potentially chronic and intermittently flaring nature of this condition and treatment options were discussed extensively with the patient today.  At this time, I recommend topical anti-fungal therapy with Ketoconazole 2% shampoo, which the patient was instructed to use 2-3 days per week, alternating with over-the-counter anti-dandruff shampoos, such as Head & Shoulders, Selsun Blue, and Neutrogena T-gel, every month.  The risks, benefits, and side effects of this medication were discussed.  The patient expressed understanding and is in agreement with this plan.    ketoconazole (NIZOral) 2 % shampoo - Scalp  Wash affected areas of scalp 2-3 times weekly as directed    8. History of nonmelanoma skin cancer  On the patient's left  temple, left lateral lower cutaneous lip, left medial lower back, right upper lateral chest, right upper cutaneous lip, right ear, nose, there are well-healed scars with no evidence of recurrent growth on exam today.    History of nonmelanoma skin cancers and actinic keratoses and photodamage.  There is no evidence of recurrence at the sites of the patient's previously treated NMSCs on exam today.  The signs and symptoms of skin cancer were reviewed and the patient was advised to practice sun protection and sun avoidance, use daily sunscreen, and perform regular self skin exams.  I will have the patient return to our office in 4 to 6 months, pending the above biopsy results, for routine follow-up and skin exam, and the patient was instructed to call our office should the patient notice any new, changing, symptomatic, or otherwise concerning skin lesions before then.  The patient expressed understanding and is in agreement with this plan.    9. Diffuse photodamage of skin  Photodistributed  Diffuse photodamage with actinic changes with telangiectasia and mottled pigmentation in sun-exposed areas.    Photodamage.  The signs and symptoms of skin cancer were reviewed and the patient was advised to practice sun protection and sun avoidance, use daily sunscreen, and perform regular self skin exams.  Sun protection was discussed, including avoiding the mid-day sun, wearing a sunscreen with SPF at least 50, and stressing the need for reapplication of sunscreen and applying more than they think they need.

## 2024-08-26 DIAGNOSIS — E85.4 CARDIAC AMYLOIDOSIS (MULTI): ICD-10-CM

## 2024-08-26 DIAGNOSIS — I43 CARDIAC AMYLOIDOSIS (MULTI): ICD-10-CM

## 2024-09-05 PROCEDURE — RXMED WILLOW AMBULATORY MEDICATION CHARGE

## 2024-09-06 ENCOUNTER — TELEPHONE (OUTPATIENT)
Dept: PRIMARY CARE | Facility: CLINIC | Age: 84
End: 2024-09-06
Payer: MEDICARE

## 2024-09-06 NOTE — TELEPHONE ENCOUNTER
Patients spouse called in stating she has tested positive for covid , on Sunday evening . Patient has no symptoms , but will be going to get tested today just wanted to inform BB.

## 2024-09-09 PROCEDURE — RXMED WILLOW AMBULATORY MEDICATION CHARGE

## 2024-09-09 NOTE — TELEPHONE ENCOUNTER
Called pt's wife  +COVID 9/6  Mild cough which is chronic w/out other Sx   D/t eliquis will hold off paxlovid

## 2024-09-10 ENCOUNTER — TELEPHONE (OUTPATIENT)
Dept: PRIMARY CARE | Facility: CLINIC | Age: 84
End: 2024-09-10
Payer: MEDICARE

## 2024-09-10 DIAGNOSIS — H10.9 BACTERIAL CONJUNCTIVITIS: Primary | ICD-10-CM

## 2024-09-10 NOTE — TELEPHONE ENCOUNTER
Pt tested positive for covid on 9/7 and 9/10 and has pink eye. Request abx to  and call back once med is authorized by BB.

## 2024-09-11 ENCOUNTER — PHARMACY VISIT (OUTPATIENT)
Dept: PHARMACY | Facility: CLINIC | Age: 84
End: 2024-09-11
Payer: MEDICARE

## 2024-09-11 RX ORDER — TOBRAMYCIN AND DEXAMETHASONE 3; 1 MG/ML; MG/ML
1 SUSPENSION/ DROPS OPHTHALMIC
Qty: 5 ML | Refills: 0 | Status: SHIPPED | OUTPATIENT
Start: 2024-09-11 | End: 2024-09-16

## 2024-09-12 ENCOUNTER — PHARMACY VISIT (OUTPATIENT)
Dept: PHARMACY | Facility: CLINIC | Age: 84
End: 2024-09-12
Payer: MEDICARE

## 2024-09-17 DIAGNOSIS — N40.1 BENIGN LOCALIZED PROSTATIC HYPERPLASIA WITH LOWER URINARY TRACT SYMPTOMS (LUTS): ICD-10-CM

## 2024-09-17 RX ORDER — TAMSULOSIN HYDROCHLORIDE 0.4 MG/1
0.4 CAPSULE ORAL DAILY
Qty: 90 CAPSULE | Refills: 3 | Status: SHIPPED | OUTPATIENT
Start: 2024-09-17

## 2024-09-18 DIAGNOSIS — I43 CARDIAC AMYLOIDOSIS: ICD-10-CM

## 2024-09-18 DIAGNOSIS — E85.4 CARDIAC AMYLOIDOSIS: ICD-10-CM

## 2024-09-19 ENCOUNTER — NURSE ONLY (OUTPATIENT)
Dept: RESEARCH | Age: 84
End: 2024-09-19

## 2024-09-20 ENCOUNTER — APPOINTMENT (OUTPATIENT)
Dept: CARDIOLOGY | Facility: CLINIC | Age: 84
End: 2024-09-20
Payer: MEDICARE

## 2024-10-01 ENCOUNTER — HOSPITAL ENCOUNTER (OUTPATIENT)
Dept: CARDIOLOGY | Facility: CLINIC | Age: 84
Discharge: HOME | End: 2024-10-01
Payer: MEDICARE

## 2024-10-01 DIAGNOSIS — I43 CARDIAC AMYLOIDOSIS: ICD-10-CM

## 2024-10-01 DIAGNOSIS — E85.4 CARDIAC AMYLOIDOSIS: ICD-10-CM

## 2024-10-01 LAB
AORTIC VALVE PEAK VELOCITY: 0.92 M/S
AV PEAK GRADIENT: 3.4 MMHG
AVA (PEAK VEL): 2.98 CM2
EJECTION FRACTION APICAL 4 CHAMBER: 64.4
EJECTION FRACTION: 65 %
GLOBAL LONGITUDINAL STRAIN: 14.3 %
LEFT ATRIUM VOLUME AREA LENGTH INDEX BSA: 33.4 ML/M2
LEFT VENTRICLE INTERNAL DIMENSION DIASTOLE: 3.7 CM (ref 3.5–6)
LEFT VENTRICULAR OUTFLOW TRACT DIAMETER: 1.95 CM
RIGHT VENTRICLE FREE WALL PEAK S': 7 CM/S
RIGHT VENTRICLE PEAK SYSTOLIC PRESSURE: 39.3 MMHG
TRICUSPID ANNULAR PLANE SYSTOLIC EXCURSION: 1.4 CM

## 2024-10-01 PROCEDURE — 93306 TTE W/DOPPLER COMPLETE: CPT

## 2024-10-09 PROCEDURE — RXMED WILLOW AMBULATORY MEDICATION CHARGE

## 2024-10-10 ENCOUNTER — PHARMACY VISIT (OUTPATIENT)
Dept: PHARMACY | Facility: CLINIC | Age: 84
End: 2024-10-10
Payer: MEDICARE

## 2024-10-23 DIAGNOSIS — I43 CARDIAC AMYLOIDOSIS: ICD-10-CM

## 2024-10-23 DIAGNOSIS — E85.4 CARDIAC AMYLOIDOSIS: ICD-10-CM

## 2024-10-24 ENCOUNTER — NURSE ONLY (OUTPATIENT)
Dept: RESEARCH | Age: 84
End: 2024-10-24

## 2024-11-06 PROCEDURE — RXMED WILLOW AMBULATORY MEDICATION CHARGE

## 2024-11-07 ENCOUNTER — PHARMACY VISIT (OUTPATIENT)
Dept: PHARMACY | Facility: CLINIC | Age: 84
End: 2024-11-07
Payer: MEDICARE

## 2024-11-07 ENCOUNTER — APPOINTMENT (OUTPATIENT)
Dept: PHARMACY | Facility: HOSPITAL | Age: 84
End: 2024-11-07
Payer: MEDICARE

## 2024-11-07 DIAGNOSIS — I48.92 ATRIAL FIBRILLATION AND FLUTTER: ICD-10-CM

## 2024-11-07 DIAGNOSIS — I48.91 ATRIAL FIBRILLATION AND FLUTTER: ICD-10-CM

## 2024-11-07 DIAGNOSIS — I48.91 ATRIAL FIBRILLATION, UNSPECIFIED TYPE (MULTI): ICD-10-CM

## 2024-11-07 NOTE — Clinical Note
Kareen Watson, Today's appointment was 3 month follow up regarding anticoagulation pharmacotherapy. Archie is doing well on Eliquis, confirms adherence and denies any missed doses. Reports no abnormal bruising or bleeding. No hospitalizations or falls to report. Will renew his  PAP for cost assistance, and follow up in 6 months.

## 2024-11-07 NOTE — PROGRESS NOTES
Pharmacist Clinic: Cardiology Management    Archie Pedersen is a 84 y.o. male was referred to Clinical Pharmacy Team for anticoagulation management.     Referring Provider: Shirley Jenkins, APRN-CNP    THIS IS A FOLLOW UP PATIENT APPOINTMENT. AT LAST VISIT ON 8/9/2024 WITH PHARMACIST (Marybeth Morales).    Appointment was completed by the patient and patient's spouse, who were reached at .    REVIEW OF PAST APPNT (IF APPLICABLE):   Today's appointment was 3 month follow up regarding anticoagulation therapy with Eliquis. Patient states he is doing well, denies any missed doses, adverse effects, hospitalizations, or falls. Completed a medication reconciliation with patient today as well. Discussed [facility] PAP renewal which will need to be completed prior to 12/7/2024. Will complete at next appointment in 3 months.       Allergies   Allergen Reactions    Penicillins Unknown       Past Medical History:   Diagnosis Date    Calculus of kidney 02/25/2022    Left nephrolithiasis    Personal history of diseases of the skin and subcutaneous tissue 11/10/2014    History of seborrheic keratosis    Personal history of other benign neoplasm 10/13/2017    History of other benign neoplasm    Personal history of other diseases of the circulatory system 12/18/2020    History of atrial flutter    Personal history of other malignant neoplasm of skin     History of squamous cell carcinoma of skin    Personal history of urinary calculi 10/04/2019    History of renal calculi       Current Outpatient Medications on File Prior to Visit   Medication Sig Dispense Refill    apixaban (Eliquis) 5 mg tablet Take 1 tablet (5 mg) by mouth 2 times a day. 180 tablet 3    atorvastatin (Lipitor) 20 mg tablet TAKE ONE TABLET BY MOUTH ONCE DAILY 90 tablet 3    clindamycin (Cleocin T) 1 % lotion Apply topically 2 times a day. (Patient not taking: Reported on 8/1/2024) 60 mL 11    furosemide (Lasix) 20 mg tablet Take 1 tablet (20 mg) by mouth once daily.  90 tablet 3    ketoconazole (NIZOral) 2 % shampoo Wash affected areas of scalp 2-3 times weekly as directed 120 mL 11    KRILL OIL ORAL Take 500 mg by mouth once daily.      levothyroxine (Synthroid, Levoxyl) 50 mcg tablet TAKE ONE TABLET BY MOUTH DAILY 90 tablet 3    magnesium oxide (Mag-Ox) 250 mg magnesium tablet Take 1 tablet (250 mg) by mouth once daily.      multivitamin tablet Take 1 tablet by mouth once daily.      propranolol (Inderal) 20 mg tablet Take 1.5 tablets (30 mg) by mouth 2 times a day. 270 tablet 3    saw palmetto 500 mg capsule Take 1 capsule (500 mg) by mouth once daily.      spironolactone (Aldactone) 25 mg tablet TAKE ONE TABLET BY MOUTH EVERY DAY 90 tablet 3    tafamidis (Vyndamax) 61 mg capsule Take 1 capsule (61 mg) by mouth once daily. 30 capsule 5    tamsulosin (Flomax) 0.4 mg 24 hr capsule TAKE ONE CAPSULE BY MOUTH EVERY DAY 90 capsule 3    triamcinolone (Kenalog) 0.1 % cream Apply topically 2 times a day. Apply to affected area 1-2 times daily as needed. Avoid face and groin. (Patient not taking: Reported on 8/1/2024) 80 g 1     Current Facility-Administered Medications on File Prior to Visit   Medication Dose Route Frequency Provider Last Rate Last Admin    Study [id number]/placebo syringe 45 mg  45 mg subcutaneous Once Malachi Garcia MD MPH        Study [id number]/placebo syringe 45 mg  45 mg subcutaneous Once Malachi Garcia MD MPH        Study [id number]/placebo syringe 45 mg  45 mg subcutaneous Once Malachi Garcia MD MPH        Study [id number]/placebo syringe 45 mg  45 mg subcutaneous Once Malachi Garcia MD MPH        Study [id number]/placebo syringe 45 mg  45 mg subcutaneous Once Malachi Garcia MD MPH        Study [id number]/placebo syringe 45 mg  45 mg subcutaneous Once Malachi Garcia MD MPH        Study [id number]/placebo syringe 45 mg  45 mg subcutaneous Once  "Malachi Garcia MD MPH        Study [id number]/placebo syringe 45 mg  45 mg subcutaneous Once Malachi Garcia MD MPH        Study [id number]/placebo syringe 45 mg  45 mg subcutaneous Once Malachi Garcia MD MPH        Study [id number]/placebo syringe 45 mg  45 mg subcutaneous Once Malachi Garcia MD MPH        Study [id number]/placebo syringe 45 mg  45 mg subcutaneous Once Malachi Garcia MD MPH        Study [id number]/placebo syringe 45 mg  45 mg subcutaneous Once Malachi Garcia MD MPH        Study [id number]/placebo syringe 45 mg  45 mg subcutaneous Once Malachi Garcia MD MPH        Study [id number]/placebo syringe 45 mg  45 mg subcutaneous Once Malachi Garcia MD MPH             RELEVANT LAB RESULTS  Lab Results   Component Value Date    BILITOT 0.9 07/01/2024    CALCIUM 9.9 07/01/2024    CO2 30 07/01/2024     07/01/2024    CREATININE 1.30 07/01/2024    GLUCOSE 96 07/01/2024    ALKPHOS 89 07/01/2024    K 4.9 07/01/2024    PROT 6.8 07/01/2024     07/01/2024    AST 23 07/01/2024    ALT 12 07/01/2024    BUN 32 (H) 07/01/2024    ANIONGAP 12 07/01/2024    MG 2.00 12/16/2020    PHOS 4.1 10/04/2021    ALBUMIN 4.3 07/01/2024    GFRMALE 53 (A) 06/30/2023     Lab Results   Component Value Date    TRIG 89 07/01/2024    CHOL 108 07/01/2024    LDLCALC 45 07/01/2024    HDL 45.2 07/01/2024     No results found for: \"BMCBC\", \"CBCDIF\"     PHARMACEUTICAL ASSESSMENT    Drug Interactions? No clinically significant drug interactions requiring change in therapy found at the time of this visit.     Medication Documentation Review Audit       Reviewed by Cayden Turner MA (Medical Assistant) on 09/06/24 at 0922      Medication Order Taking? Sig Documenting Provider Last Dose Status   apixaban (Eliquis) 5 mg tablet [id number] No Take 1 tablet (5 mg) by mouth 2 times a day. Shirley Jenkins, APRN-CNP Taking Active "   atorvastatin (Lipitor) 20 mg tablet [id number] No TAKE ONE TABLET BY MOUTH ONCE DAILY Brittany Behm,  Taking Active   clindamycin (Cleocin T) 1 % lotion [id number] No Apply topically 2 times a day.   Patient not taking: Reported on 8/1/2024    Ye Frederick MD Not Taking Active   furosemide (Lasix) 20 mg tablet [id number] No Take 1 tablet (20 mg) by mouth once daily. Brittany Behm,  Taking Active   ketoconazole (NIZOral) 2 % shampoo [id number]  Wash affected areas of scalp 2-3 times weekly as directed Ye Frederick MD  Active   KRILL OIL ORAL [id number] No Take 500 mg by mouth once daily. Historical Provider, MD Taking Active   levothyroxine (Synthroid, Levoxyl) 50 mcg tablet [id number] No TAKE ONE TABLET BY MOUTH DAILY Brittany Behm, DO Taking Active   magnesium oxide (Mag-Ox) 250 mg magnesium tablet [id number] No Take 1 tablet (250 mg) by mouth once daily. Historical Provider, MD Taking Active   multivitamin tablet [id number] No Take 1 tablet by mouth once daily. Historical Provider, MD Taking Active   propranolol (Inderal) 20 mg tablet [id number] No Take 1.5 tablets (30 mg) by mouth 2 times a day. Brittany Behm,  Taking Active   saw palmetto 500 mg capsule [id number] No Take 1 capsule (500 mg) by mouth once daily. Historical Provider, MD Taking Active   spironolactone (Aldactone) 25 mg tablet [id number] No TAKE ONE TABLET BY MOUTH EVERY DAY Brittany Behm, DO Taking Active   Study [id number]/placebo syringe 45 mg [id number]   Malachi Garcia MD MPH  Active   Study [id number]/placebo syringe 45 mg [id number]   Malachi Garcia MD MPH  Active   Study [id number]/placebo syringe 45 mg [id number]   Malachi Garcia MD MPH  Active   Study [id number]/placebo syringe 45 mg [id number]   Malachi Garcia MD MPH  Active   Study [id number]/placebo syringe 45 mg [id number]   Malachi Garcia MD MPH  Active   Study [id number]/placebo syringe 45  mg [id number]   Malachi Garcia MD MPH  Active   Study [id number]/placebo syringe 45 mg [id number]   Malachi Garcia MD MPH  Active   Study [id number]/placebo syringe 45 mg [id number]   Malachi Garcia MD MPH  Active   Study [id number]/placebo syringe 45 mg [id number]   Malachi Garcia MD MPH  Active   Study [id number]/placebo syringe 45 mg [id number]   Malachi Garcia MD MPH  Active   Study [id number]/placebo syringe 45 mg [id number]   Malachi Garcia MD MPH  Active   Study [id number]/placebo syringe 45 mg [id number]   Malachi Garcia MD MPH  Active   tafamidis (Vyndamax) 61 mg capsule [id number] No Take 1 capsule (61 mg) by mouth once daily. Malachi Garcia MD MPH Taking Active   triamcinolone (Kenalog) 0.1 % cream [id number] No Apply topically 2 times a day. Apply to affected area 1-2 times daily as needed. Avoid face and groin.   Patient not taking: Reported on 8/1/2024    Brittany Behm, DO Not Taking Active                    DISEASE MANAGEMENT ASSESSMENT:     ANTICOAGULATION ASSESSMENT    The ASCVD Risk score (Nik VILLALPANDO, et al., 2019) failed to calculate for the following reasons:    The 2019 ASCVD risk score is only valid for ages 40 to 79    Risk score cannot be calculated because patient has a medical history suggesting prior/existing ASCVD    DIAGNOSIS: prevention of nonvalvular atrial fibrilliation stroke and systemic embolism  - Patient is projected to be on anticoagulation long term  - [id number] Score: [3] (only included if diagnosis is atrial fibrillation)   Age: [<65 (0)] [65-74 (+1)] [> 75 (+2)]: 2  Sex: [Male/Female (+1)]: 0  CHF history: [No/Yes(+1)]: 1  Hypertension history: [No/Yes(+1)]: 0  Stroke/TIA/thromboembolism history: [No/Yes(+2)]: 0  Vascular disease history (prior MI, peripheral artery disease, aortic plaque): [No/Yes(+1)]: 0  Diabetes history: [No/Yes(+1)]: 0    CURRENT PHARMACOTHERAPY:    Eliquis 5 mg BID  Dosage is appropriate for patient with the following criteria:  Age 84 years  Weight 74.4 kg  Scr 1.30 mg/dL (7/1/2024)    RELEVANT PAST MEDICAL HISTORY:   A-fib, cardiac amyloidosis, cardiomyopathy, hyperlipidemia, HTN, prediabetes    Affordability/Accessibility:  PAP for Eliquis - active through 12/7/2024  Adherence/Organization: confirms taking twice daily (12:00AM and 12:00PM) ; denies any missed doses  Adverse Reactions: none; patient denies any bruising or bleeding   Recent Hospitalizations: none  Recent Falls/Trauma: none  Changes in Tobacco or Alcohol Intake:   Tobacco: none; patient does not use  Alcohol: will have 1-2 beers about once per week    EDUCATION/COUNSELING:   - Counseled patient on MOA, expectations, duration of therapy, contraindications, administration, and monitoring parameters  - Counseled patient of side effects that are indicative of bleeding such as dark tarry stool, unexplainable bruising, or vomiting up a coffee ground like substance      DISCUSSION/NOTES:   Today's appointment was 3 month follow up regarding anticoagulation pharmacotherapy. Archie is doing well on Eliquis, confirms adherence and denies any missed doses. Reports no abnormal bruising or bleeding. No hospitalizations or falls to report.  Discussed interaction of alcohol and Eliquis, recommended limiting use and monitoring for signs/symptoms of abnormal bleeding.  Will renew patient's  PAP for Eliquis cost assistance and follow up in 6 months.     Patient Assistance Program (PAP) - RENEWAL     Per regulation, patient is due for their annual renewal for their  PAP application. Patient's application is due for renewal by 12/7/2024. Patient understands that if proper documentation is not received before renewal date, they will no longer be able to receive approved medication(s) free of charge.     Application for program to be submitted for the following medications: Eliquis    [facility]  Address: [facility]   Prescription Insurance:   Yes   Members of Household: 2   Files Taxes: Yes - files jointly with spouse       Patient will be faxing financial information to pharmacist directly at .    Patient verbally reports monthly or yearly income which is less than 400% federal poverty level    Patient aware this process may take up to 6 weeks.     If approved medication must be filled through [facility] and MEDICATION WILL BE MAILED TO PATIENT.   ASSESSMENT AND PLAN:    Assessment/Plan   Problem List Items Addressed This Visit       Atrial fibrillation and flutter     Archie is doing well on anticoagulation with Eliquis. No abnormal bruising/bleeding reported. Dosage is appropriate for age, weight, and renal function. Will continue current dose and follow up in 6 months.          Other Visit Diagnoses       Atrial fibrillation, unspecified type (Multi)        Relevant Orders    Referral to Clinical Pharmacy                RECOMMENDATIONS/PLAN    Continue: Eliquis 5 mg BID  Follow up: 6 months    Last Appointment with Referring Provider: 11/8/2023  Next Appointment with Referring Provider: 11/13/2024  Clinical Pharmacist follow up: 5/7/2025  VAF/Application Expiration: Yes    Date: 12/7/2024  Type of Encounter: Gunner Morales PharmD    Verbal consent to manage patient's drug therapy was obtained from the patient . They were informed they may decline to participate or withdraw from participation in pharmacy services at any time.    Continue all meds under the continuation of care with the referring provider and clinical pharmacy team.

## 2024-11-07 NOTE — ASSESSMENT & PLAN NOTE
Archie is doing well on anticoagulation with Eliquis. No abnormal bruising/bleeding reported. Dosage is appropriate for age, weight, and renal function. Will continue current dose and follow up in 6 months.

## 2024-11-12 PROBLEM — R30.0 DYSURIA: Status: ACTIVE | Noted: 2024-11-12

## 2024-11-12 PROBLEM — I49.1 PREMATURE ATRIAL CONTRACTION: Status: ACTIVE | Noted: 2024-11-12

## 2024-11-12 PROBLEM — R20.2 PARESTHESIA: Status: ACTIVE | Noted: 2024-11-12

## 2024-11-12 PROBLEM — E55.9 VITAMIN D DEFICIENCY: Status: ACTIVE | Noted: 2024-11-12

## 2024-11-12 PROBLEM — G57.90 NEUROPATHY OF LOWER EXTREMITY: Status: ACTIVE | Noted: 2024-11-12

## 2024-11-12 PROCEDURE — RXMED WILLOW AMBULATORY MEDICATION CHARGE

## 2024-11-13 ENCOUNTER — OFFICE VISIT (OUTPATIENT)
Dept: CARDIOLOGY | Facility: CLINIC | Age: 84
End: 2024-11-13
Payer: MEDICARE

## 2024-11-13 ENCOUNTER — PHARMACY VISIT (OUTPATIENT)
Dept: PHARMACY | Facility: CLINIC | Age: 84
End: 2024-11-13
Payer: MEDICARE

## 2024-11-13 VITALS
HEIGHT: 66 IN | SYSTOLIC BLOOD PRESSURE: 114 MMHG | HEART RATE: 79 BPM | WEIGHT: 166 LBS | DIASTOLIC BLOOD PRESSURE: 63 MMHG | BODY MASS INDEX: 26.68 KG/M2 | OXYGEN SATURATION: 99 %

## 2024-11-13 DIAGNOSIS — E85.4 CARDIAC AMYLOIDOSIS: ICD-10-CM

## 2024-11-13 DIAGNOSIS — I48.91 ATRIAL FIBRILLATION AND FLUTTER: Primary | ICD-10-CM

## 2024-11-13 DIAGNOSIS — E78.2 MIXED HYPERLIPIDEMIA: ICD-10-CM

## 2024-11-13 DIAGNOSIS — I43 CARDIAC AMYLOIDOSIS: ICD-10-CM

## 2024-11-13 DIAGNOSIS — I48.92 ATRIAL FIBRILLATION AND FLUTTER: Primary | ICD-10-CM

## 2024-11-13 PROCEDURE — 99214 OFFICE O/P EST MOD 30 MIN: CPT | Performed by: NURSE PRACTITIONER

## 2024-11-13 ASSESSMENT — PAIN SCALES - GENERAL: PAINLEVEL_OUTOF10: 0-NO PAIN

## 2024-11-13 NOTE — PROGRESS NOTES
Subjective   Archie Pedersen is a 84 y.o. male.    Chief Complaint:  Hyperlipidemia, Atrial Fibrillation, and Cardiomyopathy    Mr. Pedersen returns for his annual follow up. He has been feeling well from a cardiac standpoint. He has remained compliant with his medications, denying any intolerances. He is following closely with Dr. Garcia and the HF team for his cardiac amyloidosis. He has some dyspnea, though this remains stable and not overly limiting. He denies any chest pain at his current activity level. He denies any recent ER visits or hospitalizations. He offers no specific cardiovascular complaints or concerns today. He denies any complaints of chest pain, shortness of breath, lightheadedness, dizziness, palpitations, syncope, orthopnea, paroxysmal nocturnal dyspnea, lower extremity swelling or bleeding concerns.        Review of Systems   All other systems reviewed and are negative.      Objective   Physical Exam  Constitutional:       Appearance: Healthy appearance. In no distress  Pulmonary:      Effort: Pulmonary effort is normal.      Breath sounds: Normal breath sounds.   Cardiovascular:      Normal rate. Regular rhythm. Normal S1. Normal S2.       Murmurs: There is no murmur.      Carotids: right carotid pulse +2, no bruit heard over the right carotid. left carotid pulse +2, no bruit heard over the left carotid.  Edema:     Peripheral edema absent.   Abdominal:      Palpations: Abdomen is soft.   Musculoskeletal:       Cervical back: Normal range of motion.   Skin:     General: Skin is warm and dry. Normal color and pigmentation   Neurological:      Mental Status: Alert and oriented to person, place and time.   Psychiatric:     Mood and Affect: appropriate mood and appropriate affect.       Lab Review:   Lab Results   Component Value Date     07/01/2024    K 4.9 07/01/2024     07/01/2024    CO2 30 07/01/2024    BUN 32 (H) 07/01/2024    CREATININE 1.30 07/01/2024    GLUCOSE 96 07/01/2024     CALCIUM 9.9 07/01/2024     Lab Results   Component Value Date    WBC 6.2 07/01/2024    HGB 14.8 07/01/2024    HCT 46.0 07/01/2024    MCV 95 07/01/2024     07/01/2024     Lab Results   Component Value Date    CHOL 108 07/01/2024    TRIG 89 07/01/2024    HDL 45.2 07/01/2024       Assessment/Plan   Mr. Pedersen is a pleasant 84 year old  male with a past medical history significant for hyperlipidemia, atrial fibrillation/flutter with unsuccessful cardioversion 12/2020 on Eliquis anticoagulation and restrictive cardiomyopathy with wt-ATTR cardiac amyloidosis followed closely by the advanced HF team. He is also in a research study for his cardiac amyloidosis. Echocardiogram 10/2024 showed an EF of 65% with normal RVSP and mild-moderate TR. He presents today for routine follow up stable from a cardiac standpoint. His VS remain stable. He remains euvolemic on exam. He remains on appropriate antiplatelet and lipid lowering therapy with his LDL at goal. He seems to be getting around reasonably well, denying any exertional intolerances. I will have him continue all medications unchanged. He will follow up with me in clinic in one year. He knows to call for any concerns.

## 2024-11-19 ENCOUNTER — TELEPHONE (OUTPATIENT)
Dept: PHARMACY | Facility: HOSPITAL | Age: 84
End: 2024-11-19
Payer: MEDICARE

## 2024-11-19 NOTE — TELEPHONE ENCOUNTER
Patient Assistance Program Approval:     We are pleased to inform you that your application for assistance has been approved.     This approval is valid through  11/19/2025  as long as the following criteria continue to be satisfied:     Your medication (Eliquis) remains covered under your current insurance plan.   Your prescriber does not discontinue therapy.   You do not seek reimbursement from any other private or government-funded programs for the  medication.    Under this program, the pharmacy will first bill your insurance plan for your indemnified specified medication. The [employer] Assistance Fund will then offset your copay balance, so that your out-of pocket expense for your specialty medication will be $0.00.    Marybeth Morales, PharmD

## 2024-11-22 ENCOUNTER — OFFICE VISIT (OUTPATIENT)
Dept: CARDIOLOGY | Facility: CLINIC | Age: 84
End: 2024-11-22
Payer: MEDICARE

## 2024-11-22 VITALS
WEIGHT: 170 LBS | BODY MASS INDEX: 27.44 KG/M2 | SYSTOLIC BLOOD PRESSURE: 141 MMHG | TEMPERATURE: 97.4 F | OXYGEN SATURATION: 97 % | DIASTOLIC BLOOD PRESSURE: 87 MMHG | HEART RATE: 78 BPM

## 2024-11-22 DIAGNOSIS — I48.92 ATRIAL FIBRILLATION AND FLUTTER: ICD-10-CM

## 2024-11-22 DIAGNOSIS — I48.91 ATRIAL FIBRILLATION AND FLUTTER: ICD-10-CM

## 2024-11-22 DIAGNOSIS — I50.30 DIASTOLIC HEART FAILURE, STAGE C: ICD-10-CM

## 2024-11-22 DIAGNOSIS — I42.9 CARDIOMYOPATHY, UNSPECIFIED TYPE (MULTI): ICD-10-CM

## 2024-11-22 DIAGNOSIS — E85.4 CARDIAC AMYLOIDOSIS: Primary | ICD-10-CM

## 2024-11-22 DIAGNOSIS — I43 CARDIAC AMYLOIDOSIS: Primary | ICD-10-CM

## 2024-11-22 ASSESSMENT — PAIN SCALES - GENERAL: PAINLEVEL_OUTOF10: 0-NO PAIN

## 2024-11-22 NOTE — PROGRESS NOTES
Heart Failure Cardiology    Archie Pedersen is a 84 y.o. male from [employer] [employer], retired , here for routine visit per trial protocol (CardioTTRansform). He is stable with only modest functional capacity limitations (NYHA class II).    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 He says that this has helped him a lot with reduce pain in legs, legs not feeling cold.    Exam: /87   Pulse 78   Temp 36.3 °C (97.4 °F) (Temporal)   Wt 77.1 kg (170 lb)   SpO2 97%   BMI 27.44 kg/m²   No JVD   Irregular rhythm  CTA  No LE edema    Current Outpatient Medications   Medication Instructions    atorvastatin (LIPITOR) 20 mg, oral, Daily    Eliquis 5 mg, oral, 2 times daily    furosemide (LASIX) 20 mg, oral, Daily    ketoconazole (NIZOral) 2 % shampoo Wash affected areas of scalp 2-3 times weekly as directed    KRILL OIL ORAL 500 mg, Daily    levothyroxine (SYNTHROID, LEVOXYL) 50 mcg, oral, Daily    magnesium oxide (MAG-OX) 250 mg, Daily    multivitamin tablet 1 tablet, Daily    propranolol (INDERAL) 30 mg, oral, 2 times daily    saw palmetto 500 mg, Daily    spironolactone (ALDACTONE) 25 mg, oral, Daily    tamsulosin (FLOMAX) 0.4 mg, oral, Daily    Vyndamax 61 mg, oral, Daily     Medical history:  -- Labile hypertension    Cardiovascular history:  -- wt-ATTR cardiac amyloidosis  -- Stage C HFpEF, NYHA class II  -- Atrial fibrillation, on DOAC    Assessment: 84 y.o. white man with ATTR-CM and HFpEF Stage C (NYHA class II). He is currently stable on current regimen. He is enrolled in CARDIO-ATTransform RCT.     Plan:  -- Continue tafamidis and clinical trial drug (eplontersen vs. Placebo)  -- He inquired about what his risks would be if he were to pursue knee replacement surgery.  I explained that I estimate that he has an intermediate risk (~10-15%) of cardiac complications in the perioperative period.  He is going to have to consider  that further and see if he is willing to take those risks on.  There is no surgery scheduled at this time.  -- Routine follow-up per study protocol     MDM: Level 4  More than 2 stable chronic illnesses  Prescription drug management    Malachi Garcia MD, MPH  Advanced Heart Failure and Transplant Cardiology  [facility]

## 2024-11-22 NOTE — PATIENT INSTRUCTIONS
To reach Dr. Garcia's office please call [phone] ([facility]). Fax [phone]. Call [phone] to schedule an appointment. You may also contact the HF RNs at [email or web address]    Thank you for coming to your appointment today. If you have any questions or need cardiac medication refills, please call the Heart Failure Office at [phone] option [phone].     Lizeth will be in touch to set you up with another visit.

## 2024-11-26 ENCOUNTER — APPOINTMENT (OUTPATIENT)
Dept: NEUROLOGY | Facility: CLINIC | Age: 84
End: 2024-11-26
Payer: MEDICARE

## 2024-11-26 VITALS
DIASTOLIC BLOOD PRESSURE: 73 MMHG | BODY MASS INDEX: 26.95 KG/M2 | HEART RATE: 85 BPM | RESPIRATION RATE: 16 BRPM | WEIGHT: 167 LBS | SYSTOLIC BLOOD PRESSURE: 111 MMHG

## 2024-11-26 DIAGNOSIS — G25.0 ESSENTIAL TREMOR: Primary | ICD-10-CM

## 2024-11-26 DIAGNOSIS — I43 CARDIAC AMYLOIDOSIS: ICD-10-CM

## 2024-11-26 DIAGNOSIS — E85.4 CARDIAC AMYLOIDOSIS: ICD-10-CM

## 2024-11-26 RX ORDER — PROPRANOLOL HYDROCHLORIDE 20 MG/1
30 TABLET ORAL 2 TIMES DAILY
Qty: 270 TABLET | Refills: 3 | Status: SHIPPED | OUTPATIENT
Start: 2024-11-26 | End: 2025-11-26

## 2024-11-26 ASSESSMENT — ENCOUNTER SYMPTOMS
LOSS OF SENSATION IN FEET: 0
OCCASIONAL FEELINGS OF UNSTEADINESS: 1
DEPRESSION: 0

## 2024-11-26 ASSESSMENT — UNIFIED PARKINSONS DISEASE RATING SCALE (UPDRS)
FREEZING_GAIT: 0
POSTURE: 0
CONSTANCY_TREMOR_ATREST: 0
TOETAPPING_LEFT: 1
POSTURAL_TREMOR_RIGHTHAND: 0
AMPLITUDE_LUE: 0
RIGIDITY_NECK: 0
TOETAPPING_RIGHT: 1
RIGIDITY_LLE: 0
LEG_AGILITY_LEFT: 0
RIGIDITY_RLE: 0
POSTURAL_TREMOR_LEFTHAND: 1
AMPLITUDE_RLE: 0
TOTAL_SCORE: 15
AMPLITUDE_LIP_JAW: 0
KINETIC_TREMOR_RIGHTHAND: 1
FACIAL_EXPRESSION: 0
SPONTANEITY_OF_MOVEMENT: 0
PRONATION_SUPINATION_LEFT: 2
AMPLITUDE_LLE: 0
LEG_AGILITY_RIGHT: 0
RIGIDITY_LUE: 0
POSTURAL_STABILITY: 1
FINGER_TAPPING_LEFT: 1
FINGER_TAPPING_RIGHT: 1
SPEECH: 1
HANDMOVEMENTS_RIGHT: 0
KINETIC_TREMOR_LEFTHAND: 1
RIGIDITY_RUE: 0
PARKINSONS_MEDS: NO
AMPLITUDE_RUE: 0
CHAIR_RISING_SCALE: 1
PRONATION_SUPINATION_RIGHT: 2
LEVODOPA: NO
GAIT: 1

## 2024-11-26 ASSESSMENT — FAHN TOLOSA MARTIN TREMOR (FTM)
RUE TOTAL SCORE: 1
HEAD IN REPOSE: 0
DRAWING A TOTAL SCORE: 2
TOUNGE TOTAL SCORE: 0
TRUNK TOTAL SCORE: 0
TOUNGE WHEN PROTUDED: 0
UE ARM AT REST: 0
TRUNK IN REPOSE: 0
DRAWING B LEFT SCORE: 1
LE AT REST: 0
LLE TOTAL SCORE: 0
HANDWRITING WITH DOMINANT HAND: 1
UE ARMS OUTSTRECHED WRISTS MILDLY EXTENDED FINGERS SPREAD APART: 0
VOICE TOTAL SCORE: 0
VOICE IN ACTION: 0
LE AT REST: 0
DRAWING B RGHT SCORE: 1
DRAWING C RIGHT SCORE: 1
DRAWING C LEFT SCORE: 1
LE LEG FLEXED AT HIPS AND KNEES AT POSTURE: 0
DRAWING A RIGHT SCORE: 1
LE TOE TO FINGER IN A FLEXED POSTURE: 0
UE ARMS OUTSTRECHED WRISTS MILDLY EXTENDED FINGERS SPREAD APART: 1
RLE TOTAL SCORE: 0
TOUNGE AT REST: 0
LUE TOTAL SCORE: 2
TRUNK  WHEN SITTING OR STANDING: 0
PART A SUBTOTAL SCORE: 3
UE FINGER TO NOSE AND OTHER ACTIONS: 1
HEAD AT POSTURE WHEN STANDING OR SITTING: 0
HEAD TOTAL SCORE: 0
UE ARM AT REST: 0
UE FINGER TO NOSE AND OTHER ACTIONS: 1
DRAWING C TOTAL SCORE: 2
LE LEG FLEXED AT HIPS AND KNEES AT POSTURE: 0
FACE IN REPOSE: 0
DRAWING A LEFT SCORE: 1
FACE AT POSTURE WHEN STANDING OR SITTING: 0
DRAWING B TOTAL SCORE: 2
LE TOE TO FINGER IN A FLEXED POSTURE: 0
FACE TOTAL: 0

## 2024-11-26 ASSESSMENT — PATIENT HEALTH QUESTIONNAIRE - PHQ9
1. LITTLE INTEREST OR PLEASURE IN DOING THINGS: NOT AT ALL
SUM OF ALL RESPONSES TO PHQ9 QUESTIONS 1 AND 2: 0
2. FEELING DOWN, DEPRESSED OR HOPELESS: NOT AT ALL

## 2024-11-26 ASSESSMENT — VISUAL ACUITY: VA_NORMAL: 1

## 2024-11-26 NOTE — PROGRESS NOTES
Subjective     Archie Pedersen is a   84 y.o. year old male who presents with FUV of tremor.  Visit type: follow up visit     HPI  Tremor doing well. Denies any current side effects of lightheadedness or orthostatic dizziness.   Remains independent in ADLs. No falls. States walking better after leg vein ablation, but still has some impairment. Uses walker for long walks.    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    Does mention he is growing more tired recently. Sleeps 9 hours a day and takes frequent naps. No snoring or witnessed apneas. Fatigue mostly activity.     Meds  propranolol 30mg 2 times a day  SE: none at this dose (higher=dizziness)     Previously tried:   Primidone  Sinemet SE worsening of tremor     Active Problems  Problems    · Abnormal liver scan (794.8) (R93.2)   · Abnormal TSH (790.6) (R79.89)   · Atrial fibrillation and flutter (427.31,427.32) (I48.91,I48.92)   · Basal cell carcinoma of upper lip (173.01) (C44.01)   · Benign localized prostatic hyperplasia with lower urinary tract symptoms (LUTS)  (600.21,599.69) (N40.1)   · BMI 25.0-25.9,adult (V85.21) (Z68.25)   · BMI 26.0-26.9,adult (V85.22) (Z68.26)   · BPH with elevated PSA (600.00,790.93) (N40.0,R97.20)   · 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 Declines new set.   · Depression screening (V79.0) (Z13.31)   · Diastolic heart failure, stage C (428.30) (I50.30)   · Dyspnea on exertion (786.09) (R06.09)   · Essential tremor (333.1) (G25.0)   · Eustachian tube disorder (381.9) (H69.90)   · High risk medication use (V58.69) (Z79.899)   · Hoarseness (784.42) (R49.0)   · Hyperlipidemia (272.4)  (E78.5)   · Hypothyroidism (244.9) (E03.9)   · Laryngopharyngeal reflux (LPR) (478.79) (K21.9)   · Low BP (458.9) (I95.9)   · Lumbar paraspinal muscle spasm (724.8) (M62.830)   · Need for influenza vaccination (V04.81) (Z23)   · Normocytic anemia (285.9) (D64.9)   · Osteoarthritis of knee (715.36) (M17.9)   · Parkinsonism, unspecified Parkinsonism type (332.0) (G20)   · Research study patient (V70.7) (Z00.6)   · Suprapubic pain (789.09) (R10.2)   · Weight loss (783.21) (R63.4)     Past Medical History  Problems    · History of Adenomatous polyp (V13.89) (Z86.018)   · Resolved Date: 20 Mar 2019   · History of atrial flutter (V12.59) (Z86.79)   · Resolved Date: 2021   · History of renal calculi (V13.01) (Z87.442)   · Resolved Date: 2020   · History of seborrheic keratosis (V13.3) (Z87.2)   · Resolved Date: 20 Mar 2019   · History of squamous cell carcinoma of skin (V10.83) (Z85.828)   · Followed by derm   · History of Left nephrolithiasis (592.0) (N20.0)   · Resolved Date: 02 Mar 2022     Surgical History  Problems    · History of Cystoscopy   · History of Renal lithotripsy   · History of Skin lesion excision   · Skin cancer removed from lip and ear   · History of Tooth extraction     Family History  Mother    · No pertinent family history  Father    · Family history of sudden death (V19.8) (Z84.89)   Father and brother both have  in their sleep, age unknown  Brother    · Family history of sudden death (V19.8) (Z84.89)   Father and brother both have  in their sleep, age unknown     Social History  Problems    · Alcohol use (V49.89) (Z78.9)   · No illicit drug use   · Tobacco use (305.1) (Z72.0)   · Max 1 pack Q 2weeks ages 19 to 22. Rare cigars now.   · Uses chewing tobacco       Review of Systems  All other system have been reviewed and are negative for complaint.  Objective   Neurological Exam  Mental Status  Awake, alert and oriented to person, place and time. Speech is normal. Language is  fluent with no aphasia.    Cranial Nerves  CN II: Visual acuity is normal. Visual fields full to confrontation.  CN III, IV, VI: Extraocular movements intact bilaterally. Normal lids and orbits bilaterally. Pupils equal round and reactive to light bilaterally.  CN V: Facial sensation is normal.  CN VII: Full and symmetric facial movement.  CN VIII: Hearing is normal.  CN IX, X: Palate elevates symmetrically. Normal gag reflex.  CN XI: Shoulder shrug strength is normal.  CN XII: Tongue midline without atrophy or fasciculations.    Motor   Strength is 5/5 throughout all four extremities.    Sensory  Light touch is normal in upper and lower extremities.       Physical Exam  Eyes:      General: Lids are normal.      Extraocular Movements: Extraocular movements intact.      Pupils: Pupils are equal, round, and reactive to light.   Neurological:      Motor: Motor strength is normal.  Psychiatric:         Speech: Speech normal.                            Physical Exam     MDS UPDRS 1st Score: Motor Examination  Is the patient on medication for treating the symptoms of Parkinson's Disease?: No  Is the patient on Levodopa?: No  Speech: 1  Facial Expression: 0  Rigidty Neck: 0  Rigidty RUE: 0  Rigidity - LUE: 0  Rigidity RLE: 0  Rigidity LLE: 0  Finger Tapping Right Hand: 1  Finger Tapping Left Hand: 1  Hand Movements- Right Hand: 0  Hand Movements- Left Hand: 0  Pronatiaon-Supination Movments - Right Hand: 2  Pronatiaon-Supination Movments Left Hand: 2  Toe Tapping Right Foot: 1  Toe Tapping - Left Foot: 1  Leg Agility - Right Le  Leg Agility - Left le  Arising from Chair: 1  Gait: 1  Freezing of Gait: 0  Postural Stability: 1  Posture: 0  Global Spontanteity of Movment ( Body Bradykinesia): 0  Postural Tremor - Right Hand: 0  Postural Tremor - Left hand: 1  Kinetic Tremor - Right hand: 1  Kinetic Tremor - Left hand: 1  Rest Tremor Amplitude - RUE: 0  Rest Tremor Amplitude - LUE: 0  Rest Tremor Amplitude - RLE:  0  Rest Tremor Amplitude - LLE: 0  Rest Tremor Amplitude - Lip/Jaw: 0  Constancy of Rest Tremor: 0  MDS UPDRS Total Score: 15           Office Visit from 11/26/2024 in [facility] [place] with Mustapha Cortes MD     11/26/2024    1307   PART A     Face at rest 0   Face at posture 0   Face Total 0   Tongue at rest 0   Tongue at posture 0   Tongue total 0   Voice in action 0   Voice total 0   Head at rest 0   Head at posture 0   Head total 0   RUE at rest 0   RUE at posture 0   RUE in action 1   RUE total 1   LUE at rest 0   LUE at posture 1   LUE in action 1   LUE total 2   Trunk at rest 0   Trunk at posture 0   Trunk total 0   RLE at rest 0   RLE at posture 0   RLE in action 0   RLE total 0   LLE at rest 0   LLE at posture 0   LLE in action 0   LLE total 0   Part A subtotal 3   PART B     Handwriting dominant 1   Drawing A - Right 1   Drawing A - Left 1   Drawing A total 2   Drawing B - Right 1   Drawing B - Left 1   Drawing B total 2   Drawing C - Right 1   Drawing C - Left 1   Pouring --   Drawing C total 2   Part B subtotal 13                 Assessment/Plan Mr. Pedersen is an 84 YO man with ET and parkinsonism here for FUV. Marked improvement in tremors on propranolol in regards to ADLs (eating, dressing,writing), has mild tremor still that is not bothersome, 30mg BID raised to 40mg BID without improvement here and SE orthostatic lightheadedness that resolved going back to 30mg BID. Incidentally notes improvement in tremor after starting study drug for ATTR cardiac amyloid. There are mild bradykinesia LT>RT  And mild rigidity n both upper and lower extremities which did not progress compared to last visit.Therefore we plan continue with propranolol 30 mg BID for ET.      Plan    Continue propranolol 30 mg BID.  FUV in 12 months        Michael Barcenas MD  Neurology, PGY-3    Patient seen and discussed with attending physician Dr. Cortes.

## 2024-11-26 NOTE — PATIENT INSTRUCTIONS
Mr. Pedersen,    You were seen for follow up of essential tremor. This seems to be well controlled on the propranolol. We will continue you at your current dose.    Please follow up in 9-12 months.    It was a pleasure seeing you,  Dr. Barcenas on behalf of Dr. Cortes with [facility]

## 2024-12-03 PROCEDURE — RXMED WILLOW AMBULATORY MEDICATION CHARGE

## 2024-12-05 ENCOUNTER — PHARMACY VISIT (OUTPATIENT)
Dept: PHARMACY | Facility: CLINIC | Age: 84
End: 2024-12-05
Payer: MEDICARE

## 2024-12-11 NOTE — RESEARCH NOTES
"Patient seen today for research routine follow up appointment (Week 17).  Daughter present.  Vitals, blood, and urine were obtained per protocol. Delivered to [facility] for packaging and shipping.  Drug ([id number]) vs placebo given LLQ.  No reports or complaints concerning medication.     Patient noted that he had a lower leg injury with concern of a \"blood clot\". Upon chart review pt. had US DVT and CT imaging. Negative for DVT, subcutaneous fluid noted with hematoma. Event noted as non-serious AE per protocol definition (section 9.3).  "
Yes

## 2025-01-02 ENCOUNTER — LAB (OUTPATIENT)
Dept: LAB | Facility: LAB | Age: 85
End: 2025-01-02
Payer: MEDICARE

## 2025-01-02 DIAGNOSIS — E78.5 HYPERLIPIDEMIA, UNSPECIFIED HYPERLIPIDEMIA TYPE: ICD-10-CM

## 2025-01-02 DIAGNOSIS — R73.03 PREDIABETES: ICD-10-CM

## 2025-01-02 DIAGNOSIS — E03.8 OTHER SPECIFIED HYPOTHYROIDISM: ICD-10-CM

## 2025-01-02 DIAGNOSIS — N40.1 BENIGN LOCALIZED PROSTATIC HYPERPLASIA WITH LOWER URINARY TRACT SYMPTOMS (LUTS): ICD-10-CM

## 2025-01-02 LAB
ALBUMIN SERPL BCP-MCNC: 4.6 G/DL (ref 3.4–5)
ALP SERPL-CCNC: 99 U/L (ref 33–136)
ALT SERPL W P-5'-P-CCNC: 13 U/L (ref 10–52)
ANION GAP SERPL CALC-SCNC: 15 MMOL/L (ref 10–20)
AST SERPL W P-5'-P-CCNC: 24 U/L (ref 9–39)
BASOPHILS # BLD AUTO: 0.08 X10*3/UL (ref 0–0.1)
BASOPHILS NFR BLD AUTO: 1.4 %
BILIRUB SERPL-MCNC: 0.9 MG/DL (ref 0–1.2)
BUN SERPL-MCNC: 29 MG/DL (ref 6–23)
CALCIUM SERPL-MCNC: 10.2 MG/DL (ref 8.6–10.6)
CHLORIDE SERPL-SCNC: 100 MMOL/L (ref 98–107)
CHOLEST SERPL-MCNC: 125 MG/DL (ref 0–199)
CHOLESTEROL/HDL RATIO: 2.3
CO2 SERPL-SCNC: 29 MMOL/L (ref 21–32)
CREAT SERPL-MCNC: 1.33 MG/DL (ref 0.5–1.3)
EGFRCR SERPLBLD CKD-EPI 2021: 53 ML/MIN/1.73M*2
EOSINOPHIL # BLD AUTO: 0.11 X10*3/UL (ref 0–0.4)
EOSINOPHIL NFR BLD AUTO: 1.9 %
ERYTHROCYTE [DISTWIDTH] IN BLOOD BY AUTOMATED COUNT: 13.4 % (ref 11.5–14.5)
EST. AVERAGE GLUCOSE BLD GHB EST-MCNC: 114 MG/DL
GLUCOSE SERPL-MCNC: 86 MG/DL (ref 74–99)
HBA1C MFR BLD: 5.6 %
HCT VFR BLD AUTO: 48.1 % (ref 41–52)
HDLC SERPL-MCNC: 55.3 MG/DL
HGB BLD-MCNC: 15.4 G/DL (ref 13.5–17.5)
IMM GRANULOCYTES # BLD AUTO: 0.02 X10*3/UL (ref 0–0.5)
IMM GRANULOCYTES NFR BLD AUTO: 0.3 % (ref 0–0.9)
LDLC SERPL CALC-MCNC: 50 MG/DL
LYMPHOCYTES # BLD AUTO: 1.32 X10*3/UL (ref 0.8–3)
LYMPHOCYTES NFR BLD AUTO: 23 %
MCH RBC QN AUTO: 30.9 PG (ref 26–34)
MCHC RBC AUTO-ENTMCNC: 32 G/DL (ref 32–36)
MCV RBC AUTO: 96 FL (ref 80–100)
MONOCYTES # BLD AUTO: 0.64 X10*3/UL (ref 0.05–0.8)
MONOCYTES NFR BLD AUTO: 11.1 %
NEUTROPHILS # BLD AUTO: 3.57 X10*3/UL (ref 1.6–5.5)
NEUTROPHILS NFR BLD AUTO: 62.3 %
NON HDL CHOLESTEROL: 70 MG/DL (ref 0–149)
NRBC BLD-RTO: 0 /100 WBCS (ref 0–0)
PLATELET # BLD AUTO: 241 X10*3/UL (ref 150–450)
POTASSIUM SERPL-SCNC: 5 MMOL/L (ref 3.5–5.3)
PROT SERPL-MCNC: 7.2 G/DL (ref 6.4–8.2)
PSA SERPL-MCNC: 1.19 NG/ML
RBC # BLD AUTO: 4.99 X10*6/UL (ref 4.5–5.9)
SODIUM SERPL-SCNC: 139 MMOL/L (ref 136–145)
TRIGL SERPL-MCNC: 97 MG/DL (ref 0–149)
TSH SERPL-ACNC: 3.02 MIU/L (ref 0.44–3.98)
VLDL: 19 MG/DL (ref 0–40)
WBC # BLD AUTO: 5.7 X10*3/UL (ref 4.4–11.3)

## 2025-01-02 PROCEDURE — RXMED WILLOW AMBULATORY MEDICATION CHARGE

## 2025-01-03 ENCOUNTER — TELEPHONE (OUTPATIENT)
Dept: CARDIOLOGY | Facility: HOSPITAL | Age: 85
End: 2025-01-03
Payer: MEDICARE

## 2025-01-07 ENCOUNTER — SPECIALTY PHARMACY (OUTPATIENT)
Dept: PHARMACY | Facility: CLINIC | Age: 85
End: 2025-01-07

## 2025-01-08 ENCOUNTER — APPOINTMENT (OUTPATIENT)
Dept: PRIMARY CARE | Facility: CLINIC | Age: 85
End: 2025-01-08
Payer: MEDICARE

## 2025-01-08 VITALS
SYSTOLIC BLOOD PRESSURE: 114 MMHG | DIASTOLIC BLOOD PRESSURE: 72 MMHG | HEIGHT: 66 IN | WEIGHT: 166 LBS | BODY MASS INDEX: 26.68 KG/M2 | TEMPERATURE: 97.4 F | OXYGEN SATURATION: 96 % | HEART RATE: 75 BPM | RESPIRATION RATE: 16 BRPM

## 2025-01-08 DIAGNOSIS — I50.9 CHRONIC HEART FAILURE, UNSPECIFIED HEART FAILURE TYPE: ICD-10-CM

## 2025-01-08 DIAGNOSIS — I83.93 VARICOSE VEINS OF BOTH LOWER EXTREMITIES, UNSPECIFIED WHETHER COMPLICATED: ICD-10-CM

## 2025-01-08 DIAGNOSIS — E03.9 HYPOTHYROIDISM, UNSPECIFIED TYPE: ICD-10-CM

## 2025-01-08 DIAGNOSIS — M17.11 OSTEOARTHRITIS OF RIGHT KNEE, UNSPECIFIED OSTEOARTHRITIS TYPE: Primary | ICD-10-CM

## 2025-01-08 DIAGNOSIS — E85.82 WILD-TYPE TRANSTHYRETIN-RELATED (ATTR) AMYLOIDOSIS (MULTI): ICD-10-CM

## 2025-01-08 DIAGNOSIS — E78.5 HYPERLIPIDEMIA, UNSPECIFIED HYPERLIPIDEMIA TYPE: ICD-10-CM

## 2025-01-08 DIAGNOSIS — R73.9 HYPERGLYCEMIA: ICD-10-CM

## 2025-01-08 DIAGNOSIS — I48.3 TYPICAL ATRIAL FLUTTER (MULTI): ICD-10-CM

## 2025-01-08 DIAGNOSIS — G20.C PARKINSONISM, UNSPECIFIED PARKINSONISM TYPE (MULTI): ICD-10-CM

## 2025-01-08 DIAGNOSIS — N18.30 CHRONIC KIDNEY DISEASE (CKD), ACTIVE MEDICAL MANAGEMENT WITHOUT DIALYSIS, STAGE 3 (MODERATE) (MULTI): ICD-10-CM

## 2025-01-08 NOTE — PROGRESS NOTES
"Subjective   Archie Pedersen is a 84 y.o. male who presents for Follow-up.  HPI      Neuro, Dr Cortes, in Nov for PD/ET. No changes in meds  Higher dose sinemet w orthostatsis    Still in clin triall for cardiac amyloid     Dr purvis helped w venous stasis sp ablation     R Knee pain. Considering TKA w the goal to \"walk better\" in pain w movement. Has not had injections. Previous ortho left [facility].         Current Outpatient Medications on File Prior to Visit   Medication Sig Dispense Refill    apixaban (Eliquis) 5 mg tablet Take 1 tablet (5 mg) by mouth 2 times a day. 180 tablet 3    atorvastatin (Lipitor) 20 mg tablet TAKE ONE TABLET BY MOUTH ONCE DAILY 90 tablet 3    furosemide (Lasix) 20 mg tablet Take 1 tablet (20 mg) by mouth once daily. 90 tablet 3    ketoconazole (NIZOral) 2 % shampoo Wash affected areas of scalp 2-3 times weekly as directed 120 mL 11    KRILL OIL ORAL Take 500 mg by mouth once daily.      levothyroxine (Synthroid, Levoxyl) 50 mcg tablet TAKE ONE TABLET BY MOUTH DAILY 90 tablet 3    magnesium oxide (Mag-Ox) 250 mg magnesium tablet Take 1 tablet (250 mg) by mouth once daily.      multivitamin tablet Take 1 tablet by mouth once daily.      propranolol (Inderal) 20 mg tablet Take 1.5 tablets (30 mg) by mouth 2 times a day. 270 tablet 3    saw palmetto 500 mg capsule Take 1 capsule (500 mg) by mouth once daily.      spironolactone (Aldactone) 25 mg tablet TAKE ONE TABLET BY MOUTH EVERY DAY 90 tablet 3    [START ON 1/27/2025] Study [id number]/placebo 150 mg/mL syringe Inject 0.3mL (45mg or placebo) subcutaneously every 4 weeks. First dose in clinic. Second and third dose at home. BUD 2 hours at room temp. 24 hours refrigerated. 0.3 mL 0    tafamidis (Vyndamax) 61 mg capsule Take 1 capsule (61 mg) by mouth once daily. 30 capsule 5    tamsulosin (Flomax) 0.4 mg 24 hr capsule TAKE ONE CAPSULE BY MOUTH EVERY DAY 90 capsule 3     Current Facility-Administered Medications on File Prior to " "Visit   Medication Dose Route Frequency Provider Last Rate Last Admin    Study [id number]/placebo syringe 45 mg  45 mg subcutaneous Once Malachi Garcia MD MPH        Study [id number]/placebo syringe 45 mg  45 mg subcutaneous Once Malachi Garcia MD MPH        Study [id number]/placebo syringe 45 mg  45 mg subcutaneous Once Malachi Garcia MD MPH        Study [id number]/placebo syringe 45 mg  45 mg subcutaneous Once Malachi Garcia MD MPH        Study [id number]/placebo syringe 45 mg  45 mg subcutaneous Once Malachi Garcia MD MPH        Study [id number]/placebo syringe 45 mg  45 mg subcutaneous Once Malachi Garcia MD MPH        Study [id number]/placebo syringe 45 mg  45 mg subcutaneous Once Malachi Garcia MD MPH        Study [id number]/placebo syringe 45 mg  45 mg subcutaneous Once Malachi Garcia MD MPH        Study [id number]/placebo syringe 45 mg  45 mg subcutaneous Once Malachi Garcia MD MPH        Study [id number]/placebo syringe 45 mg  45 mg subcutaneous Once Malachi Garcia MD MPH        Study [id number]/placebo syringe 45 mg  45 mg subcutaneous Once Malachi Garcia MD MPH        Study [id number]/placebo syringe 45 mg  45 mg subcutaneous Once Malachi Garcia MD MPH        Study [id number]/placebo syringe 45 mg  45 mg subcutaneous Once Malachi Garcia MD MPH        Study [id number]/placebo syringe 45 mg  45 mg subcutaneous Once Malachi Garcia MD MPH        Study [id number]/placebo syringe 45 mg  45 mg subcutaneous Once Malachi Garcia MD MPH                      Objective   /72   Pulse 75   Temp 36.3 °C (97.4 °F)   Resp 16   Ht 1.676 m (5' 6\")   Wt 75.3 kg (166 lb)   SpO2 96%   BMI 26.79 kg/m²    Physical Exam  General: NAD  HEENT:NCAT, PERRLA, nml OP  Neck: " no cervical CASI  Heart: RRR no murmur, no edema   Lungs: CTA b/l, no wheeze or rhonchi   GI: abd soft, nontender, nondistended.   MSK: no c/c/e. nml gait   Skin: warm and dry  Psych: cooperative, appropriate affect  Neuro: speech clear. A&Ox3  Assessment/Plan   Problem List Items Addressed This Visit       Hyperlipidemia'  Lipids at goal   Cont statin   Rpt labs 6 mo       Relevant Orders    CBC and Auto Differential    Comprehensive Metabolic Panel    Lipid Panel    Hypothyroidism  TSH at goal   Cont LFT4 50       Relevant Orders    TSH with reflex to Free T4 if abnormal    Osteoarthritis of knee - Primary  May benefit from inj  Did discuss preop risk w cards for TKA already but has not been seen by ortho for this yet   RF ortho     Relevant Orders    Referral to Orthopaedic Surgery    Varicose veins of both lower extremities  Improved w laser      Other Visit Diagnoses       Hyperglycemia      Check A1c       Relevant Orders    Hemoglobin A1C    Chronic kidney disease (CKD), active medical management without dialysis, stage 3 (moderate) (Multi)      Stable   Rpt labs 6mo   No nephrotox Rx       Wild-type transthyretin-related (ATTR) amyloidosis (Multi)      In clin trial       Parkinsonism, unspecified Parkinsonism type (Multi)      Stable per neuro      Chronic heart failure, unspecified heart failure type      Stable per cards       Typical atrial flutter (Multi)      On eliquis  Stable

## 2025-01-09 ENCOUNTER — PHARMACY VISIT (OUTPATIENT)
Dept: PHARMACY | Facility: CLINIC | Age: 85
End: 2025-01-09
Payer: MEDICARE

## 2025-01-23 ENCOUNTER — SPECIALTY PHARMACY (OUTPATIENT)
Dept: PHARMACY | Facility: CLINIC | Age: 85
End: 2025-01-23

## 2025-01-23 DIAGNOSIS — E85.4 CARDIAC AMYLOIDOSIS: ICD-10-CM

## 2025-01-23 DIAGNOSIS — I43 CARDIAC AMYLOIDOSIS: ICD-10-CM

## 2025-01-26 PROCEDURE — RXMED WILLOW AMBULATORY MEDICATION CHARGE

## 2025-01-31 ENCOUNTER — PHARMACY VISIT (OUTPATIENT)
Dept: PHARMACY | Facility: CLINIC | Age: 85
End: 2025-01-31
Payer: MEDICARE

## 2025-02-14 ASSESSMENT — DERMATOLOGY QUALITY OF LIFE (QOL) ASSESSMENT
RATE HOW BOTHERED YOU ARE BY SYMPTOMS OF YOUR SKIN PROBLEM (EG, ITCHING, STINGING BURNING, HURTING OR SKIN IRRITATION): 1
RATE HOW EMOTIONALLY BOTHERED YOU ARE BY YOUR SKIN PROBLEM (FOR EXAMPLE, WORRY, EMBARRASSMENT, FRUSTRATION): 0 - NEVER BOTHERED
RATE HOW BOTHERED YOU ARE BY EFFECTS OF YOUR SKIN PROBLEMS ON YOUR ACTIVITIES (EG, GOING OUT, ACCOMPLISHING WHAT YOU WANT, WORK ACTIVITIES OR YOUR RELATIONSHIPS WITH OTHERS): 0 - NEVER BOTHERED
RATE HOW BOTHERED YOU ARE BY SYMPTOMS OF YOUR SKIN PROBLEM (EG, ITCHING, STINGING BURNING, HURTING OR SKIN IRRITATION): 1
RATE HOW EMOTIONALLY BOTHERED YOU ARE BY YOUR SKIN PROBLEM (FOR EXAMPLE, WORRY, EMBARRASSMENT, FRUSTRATION): 0 - NEVER BOTHERED
RATE HOW BOTHERED YOU ARE BY EFFECTS OF YOUR SKIN PROBLEMS ON YOUR ACTIVITIES (EG, GOING OUT, ACCOMPLISHING WHAT YOU WANT, WORK ACTIVITIES OR YOUR RELATIONSHIPS WITH OTHERS): 0 - NEVER BOTHERED

## 2025-02-18 ENCOUNTER — APPOINTMENT (OUTPATIENT)
Dept: CARDIOLOGY | Facility: CLINIC | Age: 85
End: 2025-02-18
Payer: MEDICARE

## 2025-02-18 VITALS
TEMPERATURE: 97.7 F | DIASTOLIC BLOOD PRESSURE: 83 MMHG | OXYGEN SATURATION: 98 % | SYSTOLIC BLOOD PRESSURE: 130 MMHG | WEIGHT: 169 LBS | BODY MASS INDEX: 27.28 KG/M2 | HEART RATE: 85 BPM

## 2025-02-18 DIAGNOSIS — E85.4 CARDIAC AMYLOIDOSIS: Primary | ICD-10-CM

## 2025-02-18 DIAGNOSIS — I50.32 CHRONIC HEART FAILURE WITH PRESERVED EJECTION FRACTION: ICD-10-CM

## 2025-02-18 DIAGNOSIS — I43 CARDIAC AMYLOIDOSIS: Primary | ICD-10-CM

## 2025-02-18 NOTE — PROGRESS NOTES
Heart Failure Cardiology    Archie Pedersen is a 84 y.o. male from [facility] [place], retired , here for routine visit per trial protocol (CardioTTRansform). He is stable with only modest functional capacity limitations (NYHA class II).    He is stable as compared to prior. No new changes.    Exam: /83 (BP Location: Left arm, Patient Position: Sitting, BP Cuff Size: Adult)   Pulse 85   Temp 36.5 °C (97.7 °F) (Temporal)   Wt 76.7 kg (169 lb)   SpO2 98%   BMI 27.28 kg/m²   No JVD   Irregular  CTA  No LE edema    Current Outpatient Medications   Medication Instructions    atorvastatin (LIPITOR) 20 mg, oral, Daily    Eliquis 5 mg, oral, 2 times daily    furosemide (LASIX) 20 mg, oral, Daily    ketoconazole (NIZOral) 2 % shampoo Wash affected areas of scalp 2-3 times weekly as directed    KRILL OIL ORAL 500 mg, Daily    levothyroxine (SYNTHROID, LEVOXYL) 50 mcg, oral, Daily    magnesium oxide (MAG-OX) 250 mg, Daily    multivitamin tablet 1 tablet, Daily    propranolol (INDERAL) 30 mg, oral, 2 times daily    saw palmetto 500 mg, Daily    spironolactone (ALDACTONE) 25 mg, oral, Daily    tamsulosin (FLOMAX) 0.4 mg, oral, Daily    Vyndamax 61 mg, oral, Daily     Medical history:  -- Labile hypertension    Cardiovascular history:  -- wt-ATTR cardiac amyloidosis  -- Stage C HFpEF, NYHA class II  -- Atrial fibrillation, on DOAC    Assessment: 84 y.o. white man with ATTR-CM and HFpEF Stage C (NYHA class II). He is currently stable on current regimen. He is enrolled in CARDIO-ATTransform RCT. Trial is due to end soon.    Plan:  -- Continue tafamidis and clinical trial drug (eplontersen vs. Placebo)    MDM: Level 4  More than 2 stable chronic illnesses  Prescription drug management    Malachi Garcia MD, MPH  Advanced Heart Failure and Transplant Cardiology  [facility] [place]  [facility]

## 2025-02-18 NOTE — PATIENT INSTRUCTIONS
To reach Dr. Garcia's office please call [phone] ([facility]). Fax [phone]. Call [phone] to schedule an appointment. You may also contact the HF RNs at [email or web address]     Thank you for coming to your appointment today. If you have any questions or need cardiac medication refills, please call the Heart Failure Office at [phone] option [phone].      Lizeth will call to schedule

## 2025-02-19 ENCOUNTER — APPOINTMENT (OUTPATIENT)
Dept: DERMATOLOGY | Facility: CLINIC | Age: 85
End: 2025-02-19
Payer: MEDICARE

## 2025-02-19 DIAGNOSIS — L21.9 SEBORRHEIC DERMATITIS: ICD-10-CM

## 2025-02-19 DIAGNOSIS — D18.01 HEMANGIOMA OF SKIN: ICD-10-CM

## 2025-02-19 DIAGNOSIS — L57.8 DIFFUSE PHOTODAMAGE OF SKIN: ICD-10-CM

## 2025-02-19 DIAGNOSIS — L82.0 INFLAMED SEBORRHEIC KERATOSIS: Primary | ICD-10-CM

## 2025-02-19 DIAGNOSIS — L82.1 SEBORRHEIC KERATOSIS: ICD-10-CM

## 2025-02-19 DIAGNOSIS — Z85.828 HISTORY OF NONMELANOMA SKIN CANCER: ICD-10-CM

## 2025-02-19 DIAGNOSIS — L57.0 ACTINIC KERATOSIS: ICD-10-CM

## 2025-02-19 DIAGNOSIS — D22.5 MELANOCYTIC NEVUS OF TRUNK: ICD-10-CM

## 2025-02-19 NOTE — PROGRESS NOTES
Subjective     Archie Pedersen is a 84 y.o. male who presents for the following: Skin Check.  He notes a raised, rough bump on his right chest, which has been present for several months and has been itching recently.  It has not changed in any other way, including in size, shape, or color, and it does not hurt or bleed.  He also notes his scalp has been more dry and flaky recently, especially in the back.  He denies any other new, changing, or concerning skin lesions since his last visit; no bleeding, itching, or burning lesions.      Review of Systems:  No other skin or systemic complaints other than what is documented elsewhere in the note.    The following portions of the chart were reviewed this encounter and updated as appropriate:       Skin Cancer History  No skin cancer on file.    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 Esquivel on 4/6/23  - BCC on left lateral lower cutaneous lip diagnosed on 2/8/22 s/p Mohs surgery by Dr. 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 Esquivel on 5/24/16 and on right ear and nose treated by an outside Dermatologist    - Aks    - folliculitis    - no h/o melanoma    Family History:    No family history of melanoma or skin cancer    Social History:    The patient and his wife live in [place]    Allergies:  Penicillins    Current Medications / CAM's:    Current Outpatient Medications:     apixaban (Eliquis) 5 mg tablet, Take 1  tablet (5 mg) by mouth 2 times a day., Disp: 180 tablet, Rfl: 3    atorvastatin (Lipitor) 20 mg tablet, TAKE ONE TABLET BY MOUTH ONCE DAILY, Disp: 90 tablet, Rfl: 3    furosemide (Lasix) 20 mg tablet, Take 1 tablet (20 mg) by mouth once daily., Disp: 90 tablet, Rfl: 3    ketoconazole (NIZOral) 2 % shampoo, Wash affected areas of scalp 2-3 times weekly as directed, Disp: 120 mL, Rfl: 11    KRILL OIL ORAL, Take 500 mg by mouth once daily., Disp: , Rfl:     levothyroxine (Synthroid, Levoxyl) 50 mcg tablet, TAKE ONE TABLET BY MOUTH DAILY, Disp: 90 tablet, Rfl: 3    magnesium oxide (Mag-Ox) 250 mg magnesium tablet, Take 1 tablet (250 mg) by mouth once daily., Disp: , Rfl:     multivitamin tablet, Take 1 tablet by mouth once daily., Disp: , Rfl:     propranolol (Inderal) 20 mg tablet, Take 1.5 tablets (30 mg) by mouth 2 times a day., Disp: 270 tablet, Rfl: 3    saw palmetto 500 mg capsule, Take 1 capsule (500 mg) by mouth once daily., Disp: , Rfl:     spironolactone (Aldactone) 25 mg tablet, TAKE ONE TABLET BY MOUTH EVERY DAY, Disp: 90 tablet, Rfl: 3    tafamidis (Vyndamax) 61 mg capsule, Take 1 capsule (61 mg) by mouth once daily., Disp: 90 capsule, Rfl: 1    tamsulosin (Flomax) 0.4 mg 24 hr capsule, TAKE ONE CAPSULE BY MOUTH EVERY DAY, Disp: 90 capsule, Rfl: 3    Current Facility-Administered Medications:     Study [id number]/placebo syringe 45 mg, 45 mg, subcutaneous, Once, Malachi Garcia MD MPH    Study [id number]/placebo syringe 45 mg, 45 mg, subcutaneous, Once, Malachi Garcia MD MPH    Study [id number]/placebo syringe 45 mg, 45 mg, subcutaneous, Once, Malachi Garcia MD MPH    Study [id number]/placebo syringe 45 mg, 45 mg, subcutaneous, Once, Malachi Garcia MD MPH    Study [id number]/placebo syringe 45 mg, 45 mg, subcutaneous, Once, Malachi Garcia MD MPH    Study [id number]/placebo syringe 45 mg, 45 mg, subcutaneous,  Once, Malachi Garcia MD MPH    Study [id number]/placebo syringe 45 mg, 45 mg, subcutaneous, Once, Malachi Garcia MD MPH    Study [id number]/placebo syringe 45 mg, 45 mg, subcutaneous, Once, Malachi Garcia MD MPH    Study [id number]/placebo syringe 45 mg, 45 mg, subcutaneous, Once, Malachi Garcia MD MPH    Study [id number]/placebo syringe 45 mg, 45 mg, subcutaneous, Once, Malachi Garcia MD MPH    Study [id number]/placebo syringe 45 mg, 45 mg, subcutaneous, Once, Malachi Garcia MD MPH    Study [id number]/placebo syringe 45 mg, 45 mg, subcutaneous, Once, Malachi Garcia MD MPH    Study [id number]/placebo syringe 45 mg, 45 mg, subcutaneous, Once, Malachi Garcia MD MPH    Study [id number]/placebo syringe 45 mg, 45 mg, subcutaneous, Once, Malachi Garcia MD MPH    Study [id number]/placebo syringe 45 mg, 45 mg, subcutaneous, Once, Malachi Garcia MD MPH     Objective   Well appearing patient in no apparent distress; mood and affect are within normal limits.    A waist-up examination was performed including scalp, face, eyes, ears, nose, lips, neck, chest, axillae, abdomen, back, and bilateral upper extremities. All findings within normal limits unless otherwise noted below.        Assessment/Plan   1. Inflamed seborrheic keratosis  Right Breast  On the patient's right medial mid chest, there is a 1 cm erythematous and light brown-colored, hyperkeratotic, stuck-on appearing papule with a surrounding rim of erythema    Inflamed Seborrheic Keratosis -right medial mid chest.  The benign nature of this lesion was discussed with the patient today and reassurance provided.  Given the history the patient provides of frequent irritation and associated symptoms as well as its inflamed appearance on exam today, I offered to treat this lesion with liquid nitrogen cryotherapy.  The  patient expressed understanding, is in agreement with this plan, and wishes to proceed with cryotherapy today.    Destr of lesion - Right Breast  Complexity: simple    Destruction method: cryotherapy    Informed consent: discussed and consent obtained    Lesion destroyed using liquid nitrogen: Yes    Cryotherapy cycles:  2  Outcome: patient tolerated procedure well with no complications    Post-procedure details: wound care instructions given      2. Actinic keratosis (18)  Scalp (18)  Scattered on the patient's face, scalp, and bilateral ears, there are multiple erythematous, gritty, scaly macules     Actinic Keratoses -scattered on face, scalp, and bilateral ears.  The pre-cancerous nature of these lesions and treatment options were discussed with the patient today.  At this time, I recommend treatment with liquid nitrogen cryotherapy.  The patient expressed understanding, is in agreement with this plan, and wishes to proceed with cryotherapy today.    Destr of lesion - Scalp (18)  Complexity: simple    Destruction method: cryotherapy    Informed consent: discussed and consent obtained    Lesion destroyed using liquid nitrogen: Yes    Cryotherapy cycles:  1  Outcome: patient tolerated procedure well with no complications    Post-procedure details: wound care instructions given      3. Melanocytic nevus of trunk  Scattered on the patient's face, neck, trunk, and bilateral upper extremities, there are several small, round- to oval-shaped, brown-pigmented and pink-colored, symmetric, uniform-appearing macules and dome-shaped papules    Clinically benign- to slightly atypical-appearing nevi - the clinically benign- to slightly atypical-appearing nature of the patient's nevi was discussed with the patient today.  None of the patient's nevi meet threshold for biopsy today.  I emphasized the importance of performing monthly self-skin exams using the ABCDs of monitoring moles, which were reviewed with the patient today  and an informational hand-out provided.  I also emphasized the importance of sun avoidance and sun protection with daily sunscreen use.  The patient expressed understanding and is in agreement with this plan.    4. Seborrheic keratosis  Scattered on the patient's face, neck, trunk, and bilateral upper extremities, there are multiple tan- to light brown-colored, hyperkeratotic, stuck-on appearing papules of varying size and shape    Seborrheic Keratoses - the benign nature of these lesions was discussed with the patient today and reassurance provided.  No treatment is medically indicated for the noninflamed SKs at this time.    5. Hemangioma of skin  Scattered on the patient's face, neck, trunk, and bilateral upper extremities, there are multiple small, round, cherry red- to purplish-colored, symmetric, uniform, vascular-appearing macules and papules    Cherry Angiomas - the benign nature of these vascular lesions was discussed with the patient today and reassurance provided.  No treatment is medically indicated for these lesions at this time.    6. Seborrheic dermatitis  Scalp  On the patient's scalp, mainly his occipital scalp, and central chest, there are pink, scaly patches with whitish-yellowish, greasy scale    Seborrheic Dermatitis - flare on scalp and central chest.  The potentially chronic and intermittently flaring nature of this condition and treatment options were discussed extensively with the patient today.  At this time, I recommend topical anti-fungal therapy with Ketoconazole 2% shampoo, which the patient was instructed to use 2-3 days per week, alternating with over-the-counter anti-dandruff shampoos, such as Head & Shoulders, Selsun Blue, and Neutrogena T-gel, every month.  The risks, benefits, and side effects of this medication were discussed.  The patient expressed understanding and is in agreement with this plan.    Related Medications  ketoconazole (NIZOral) 2 % shampoo  Wash affected areas of  scalp 2-3 times weekly as directed    7. History of nonmelanoma skin cancer  On the patient's left temple, left lateral lower cutaneous lip, left medial lower back, right upper lateral chest, right upper cutaneous lip, right ear, nose, there are well-healed scars with no evidence of recurrent growth on exam today.    History of nonmelanoma skin cancers and actinic keratoses and photodamage.  There is no evidence of recurrence at the sites of the patient's previously treated NMSCs on exam today.  The signs and symptoms of skin cancer were reviewed and the patient was advised to practice sun protection and sun avoidance, use daily sunscreen, and perform regular self skin exams.  I will have the patient return to our office in 4 to 6 months for routine follow-up and skin exam, and the patient was instructed to call our office should the patient notice any new, changing, symptomatic, or otherwise concerning skin lesions before then.  The patient expressed understanding and is in agreement with this plan.    8. Diffuse photodamage of skin  Photodistributed  Diffuse photodamage with actinic changes with telangiectasia and mottled pigmentation in sun-exposed areas.    Photodamage.  The signs and symptoms of skin cancer were reviewed and the patient was advised to practice sun protection and sun avoidance, use daily sunscreen, and perform regular self skin exams.  Sun protection was discussed, including avoiding the mid-day sun, wearing a sunscreen with SPF at least 50, and stressing the need for reapplication of sunscreen and applying more than they think they need.

## 2025-02-25 DIAGNOSIS — E85.4 CARDIAC AMYLOIDOSIS: ICD-10-CM

## 2025-02-25 DIAGNOSIS — I43 CARDIAC AMYLOIDOSIS: ICD-10-CM

## 2025-03-04 PROCEDURE — RXMED WILLOW AMBULATORY MEDICATION CHARGE

## 2025-03-06 ENCOUNTER — PHARMACY VISIT (OUTPATIENT)
Dept: PHARMACY | Facility: CLINIC | Age: 85
End: 2025-03-06
Payer: MEDICARE

## 2025-03-16 DIAGNOSIS — E03.9 HYPOTHYROIDISM, UNSPECIFIED TYPE: ICD-10-CM

## 2025-03-17 RX ORDER — LEVOTHYROXINE SODIUM 50 UG/1
50 TABLET ORAL DAILY
Qty: 90 TABLET | Refills: 3 | Status: SHIPPED | OUTPATIENT
Start: 2025-03-17

## 2025-03-22 DIAGNOSIS — I10 HYPERTENSION, UNSPECIFIED TYPE: ICD-10-CM

## 2025-03-24 RX ORDER — SPIRONOLACTONE 25 MG/1
25 TABLET ORAL DAILY
Qty: 90 TABLET | Refills: 3 | Status: SHIPPED | OUTPATIENT
Start: 2025-03-24

## 2025-05-02 PROCEDURE — RXMED WILLOW AMBULATORY MEDICATION CHARGE

## 2025-05-05 ENCOUNTER — PHARMACY VISIT (OUTPATIENT)
Dept: PHARMACY | Facility: CLINIC | Age: 85
End: 2025-05-05
Payer: MEDICARE

## 2025-05-06 DIAGNOSIS — I43 CARDIAC AMYLOIDOSIS: ICD-10-CM

## 2025-05-06 DIAGNOSIS — Z00.6 RESEARCH EXAM: ICD-10-CM

## 2025-05-06 DIAGNOSIS — E85.4 CARDIAC AMYLOIDOSIS: ICD-10-CM

## 2025-05-06 NOTE — PROGRESS NOTES
Pharmacist Clinic: Cardiology Management    Archie Pedersen is a 85 y.o. male was referred to Clinical Pharmacy Team for anticoagulation management.     Referring Provider: Shirley Jenkins, APRN-CNP    THIS IS A FOLLOW UP PATIENT APPOINTMENT. AT LAST VISIT ON 11/7/2024 WITH PHARMACIST (Marybeth Morales).    Appointment was completed by the patient who wasreached at .    REVIEW OF PAST APPNT (IF APPLICABLE):   Today's appointment was 3 month follow up regarding anticoagulation pharmacotherapy. Archie is doing well on Eliquis, confirms adherence and denies any missed doses. Reports no abnormal bruising or bleeding. No hospitalizations or falls to report.  Discussed interaction of alcohol and Eliquis, recommended limiting use and monitoring for signs/symptoms of abnormal bleeding.  Will renew patient's  PAP for Eliquis cost assistance and follow up in 6 months.      Allergies   Allergen Reactions    Penicillins Unknown       Past Medical History:   Diagnosis Date    Calculus of kidney 02/25/2022    Left nephrolithiasis    Personal history of diseases of the skin and subcutaneous tissue 11/10/2014    History of seborrheic keratosis    Personal history of other benign neoplasm 10/13/2017    History of other benign neoplasm    Personal history of other diseases of the circulatory system 12/18/2020    History of atrial flutter    Personal history of other malignant neoplasm of skin     History of squamous cell carcinoma of skin    Personal history of urinary calculi 10/04/2019    History of renal calculi       Current Outpatient Medications on File Prior to Visit   Medication Sig Dispense Refill    apixaban (Eliquis) 5 mg tablet Take 1 tablet (5 mg) by mouth 2 times a day. 180 tablet 3    atorvastatin (Lipitor) 20 mg tablet TAKE ONE TABLET BY MOUTH ONCE DAILY 90 tablet 3    furosemide (Lasix) 20 mg tablet Take 1 tablet (20 mg) by mouth once daily. 90 tablet 3    ketoconazole (NIZOral) 2 % shampoo Wash affected  areas of scalp 2-3 times weekly as directed 120 mL 11    KRILL OIL ORAL Take 500 mg by mouth once daily.      levothyroxine (Synthroid, Levoxyl) 50 mcg tablet TAKE ONE TABLET BY MOUTH DAILY 90 tablet 3    magnesium oxide (Mag-Ox) 250 mg magnesium tablet Take 1 tablet (250 mg) by mouth once daily.      multivitamin tablet Take 1 tablet by mouth once daily.      propranolol (Inderal) 20 mg tablet Take 1.5 tablets (30 mg) by mouth 2 times a day. 270 tablet 3    saw palmetto 500 mg capsule Take 1 capsule (500 mg) by mouth once daily.      spironolactone (Aldactone) 25 mg tablet TAKE ONE TABLET BY MOUTH EVERY DAY 90 tablet 3    tafamidis (Vyndamax) 61 mg capsule Take 1 capsule (61 mg) by mouth once daily. 90 capsule 1    tamsulosin (Flomax) 0.4 mg 24 hr capsule TAKE ONE CAPSULE BY MOUTH EVERY DAY 90 capsule 3     Current Facility-Administered Medications on File Prior to Visit   Medication Dose Route Frequency Provider Last Rate Last Admin    Study [id number]/placebo syringe 45 mg  45 mg subcutaneous Once Malachi Garcia MD MPH        Study [id number]/placebo syringe 45 mg  45 mg subcutaneous Once Malachi Garcia MD MPH        Study [id number]/placebo syringe 45 mg  45 mg subcutaneous Once Malachi Garcia MD MPH        Study [id number]/placebo syringe 45 mg  45 mg subcutaneous Once Malachi Garcia MD MPH        Study [id number]/placebo syringe 45 mg  45 mg subcutaneous Once Malachi Garcia MD MPH        Study [id number]/placebo syringe 45 mg  45 mg subcutaneous Once Malachi Garcia MD MPH        Study [id number]/placebo syringe 45 mg  45 mg subcutaneous Once Malachi Garcia MD MPH        Study [id number]/placebo syringe 45 mg  45 mg subcutaneous Once Malachi Garcia MD MPH        Study [id number]/placebo syringe 45 mg  45 mg subcutaneous Once Malachi Garcia MD MPH        Study  "[id number]/placebo syringe 45 mg  45 mg subcutaneous Once Malachi Garcia MD MPH        Study [id number]/placebo syringe 45 mg  45 mg subcutaneous Once Malachi Garcia MD MPH        Study [id number]/placebo syringe 45 mg  45 mg subcutaneous Once Malachi Garcia MD MPH        Study [id number]/placebo syringe 45 mg  45 mg subcutaneous Once Malachi Garcia MD MPH        Study [id number]/placebo syringe 45 mg  45 mg subcutaneous Once Malachi Garcia MD MPH        Study [id number]/placebo syringe 45 mg  45 mg subcutaneous Once Malachi Garcia MD MPH        Study [id number]/placebo syringe 45 mg  45 mg subcutaneous Once Malachi Garcia MD MPH             RELEVANT LAB RESULTS  Lab Results   Component Value Date    BILITOT 0.9 01/02/2025    CALCIUM 10.2 01/02/2025    CO2 29 01/02/2025     01/02/2025    CREATININE 1.33 (H) 01/02/2025    GLUCOSE 86 01/02/2025    ALKPHOS 99 01/02/2025    K 5.0 01/02/2025    PROT 7.2 01/02/2025     01/02/2025    AST 24 01/02/2025    ALT 13 01/02/2025    BUN 29 (H) 01/02/2025    ANIONGAP 15 01/02/2025    MG 2.00 12/16/2020    PHOS 4.1 10/04/2021    ALBUMIN 4.6 01/02/2025    GFRMALE 53 (A) 06/30/2023     Lab Results   Component Value Date    TRIG 97 01/02/2025    CHOL 125 01/02/2025    LDLCALC 50 01/02/2025    HDL 55.3 01/02/2025     No results found for: \"BMCBC\", \"CBCDIF\"     PHARMACEUTICAL ASSESSMENT    Drug Interactions? No clinically significant drug interactions requiring change in therapy found at the time of this visit.     Medication Documentation Review Audit       Reviewed by Nazanin Keating LPN (Licensed Nurse) on 02/19/25 at 1258      Medication Order Taking? Sig Documenting Provider Last Dose Status   apixaban (Eliquis) 5 mg tablet [id number] Yes Take 1 tablet (5 mg) by mouth 2 times a day. Shirley Jenkins, APRN-CNP  Active   atorvastatin (Lipitor) 20 mg tablet " [id number] Yes TAKE ONE TABLET BY MOUTH ONCE DAILY Brittany Behm,  Taking Active   furosemide (Lasix) 20 mg tablet [id number] Yes Take 1 tablet (20 mg) by mouth once daily. Brittany Behm, DO Taking Active   ketoconazole (NIZOral) 2 % shampoo [id number] Yes Wash affected areas of scalp 2-3 times weekly as directed Ye Frederick MD  Active   KRILL OIL ORAL [id number] Yes Take 500 mg by mouth once daily. Historical Provider, MD Taking Active   levothyroxine (Synthroid, Levoxyl) 50 mcg tablet [id number] Yes TAKE ONE TABLET BY MOUTH DAILY Brittany Behm, DO Taking Active   magnesium oxide (Mag-Ox) 250 mg magnesium tablet [id number] Yes Take 1 tablet (250 mg) by mouth once daily. Historical Provider, MD Taking Active   multivitamin tablet [id number] Yes Take 1 tablet by mouth once daily. Historical Provider, MD Taking Active   propranolol (Inderal) 20 mg tablet [id number] Yes Take 1.5 tablets (30 mg) by mouth 2 times a day. Michael Barcenas MD  Active   saw palmetto 500 mg capsule [id number] Yes Take 1 capsule (500 mg) by mouth once daily. Historical Provider, MD Taking Active   spironolactone (Aldactone) 25 mg tablet [id number] Yes TAKE ONE TABLET BY MOUTH EVERY DAY Brittany Behm,  Taking Active   Study [id number]/placebo syringe 45 mg [id number]   Malachi Garcia MD MPH  Active   Study [id number]/placebo syringe 45 mg [id number]   Malachi Garcia MD MPH  Active   Study [id number]/placebo syringe 45 mg [id number]   Malachi Garcia MD MPH  Active   Study [id number]/placebo syringe 45 mg [id number]   Malachi Garcia MD MPH  Active   Study [id number]/placebo syringe 45 mg [id number]   Malachi Garcia MD MPH  Active   Study [id number]/placebo syringe 45 mg [id number]   Malachi Garcia MD MPH  Active   Study [id number]/placebo syringe 45 mg [id number]   Malachi Garcia MD MPH  Active   Study [id number]/placebo syringe  45 mg [id number]   Malachi Garcia MD MPH  Active   Study [id number]/placebo syringe 45 mg [id number]   Malachi Garcia MD MPH  Active   Study [id number]/placebo syringe 45 mg [id number]   Malachi Garcia MD MPH  Active   Study [id number]/placebo syringe 45 mg [id number]   Malachi Garcia MD MPH  Active   Study [id number]/placebo syringe 45 mg [id number]   Malachi Garcia MD MPH  Active   Study [id number]/placebo syringe 45 mg [id number]   Malachi Garcia MD MPH  Active   Study [id number]/placebo syringe 45 mg [id number]   Malachi Garcia MD MPH  Active   Study [id number]/placebo syringe 45 mg [id number]   Malachi Garcia MD MPH  Active   tafamidis (Vyndamax) 61 mg capsule [id number] Yes Take 1 capsule (61 mg) by mouth once daily. Malachi Garcia MD MPH  Active   tamsulosin (Flomax) 0.4 mg 24 hr capsule [id number] Yes TAKE ONE CAPSULE BY MOUTH EVERY DAY Brittany Behm, DO  Active                    DISEASE MANAGEMENT ASSESSMENT:     ANTICOAGULATION ASSESSMENT    The ASCVD Risk score (Nik VILLALPANDO, et al., 2019) failed to calculate for the following reasons:    The 2019 ASCVD risk score is only valid for ages 40 to 79    Risk score cannot be calculated because patient has a medical history suggesting prior/existing ASCVD    DIAGNOSIS: prevention of nonvalvular atrial fibrilliation stroke and systemic embolism  - Patient is projected to be on anticoagulation long term  - [id number] Score: [3] (only included if diagnosis is atrial fibrillation)   Age: [<65 (0)] [65-74 (+1)] [> 75 (+2)]: 2  Sex: [Male/Female (+1)]: 0  CHF history: [No/Yes(+1)]: 1  Hypertension history: [No/Yes(+1)]: 0  Stroke/TIA/thromboembolism history: [No/Yes(+2)]: 0  Vascular disease history (prior MI, peripheral artery disease, aortic plaque): [No/Yes(+1)]: 0  Diabetes history: [No/Yes(+1)]: 0    CURRENT PHARMACOTHERAPY:   Eliquis 5 mg  BID  Dosage is appropriate for patient with the following criteria:  Age 85 years  Weight 76.7 kg  Scr 1.33 mg/dL (1/2/2025)    RELEVANT PAST MEDICAL HISTORY:   A-fib, cardiac amyloidosis, cardiomyopathy, hyperlipidemia, HTN, prediabetes    Affordability/Accessibility:  PAP for Eliquis   Adherence/Organization: confirms taking twice daily; denies any missed doses  Adverse Reactions: patient denies any bruising or bleeding  Recent Hospitalizations: none  Recent Falls/Trauma: none  Changes in Tobacco or Alcohol Intake:   Tobacco: none; patient does not use  Alcohol: rare use; denies having more than 2 drinks per week    EDUCATION/COUNSELING:   - Counseled patient on MOA, expectations, duration of therapy, contraindications, administration, and monitoring parameters  - Counseled patient of side effects that are indicative of bleeding such as dark tarry stool, unexplainable bruising, or vomiting up a coffee ground like substance      DISCUSSION/NOTES:   Today's appointment was 6 month follow up regarding anticoagulation with Eliquis.  Archie reports no abnormal bruising or bleeding; no recent hospitalizations or falls. He does live with his spouse, who would be able to call for medical attention if he were to fall. Counseled to ensure he calls provider for any future falls.  Will follow up in 5 months for  PAP renewal.     ASSESSMENT AND PLAN:    Assessment/Plan   Problem List Items Addressed This Visit       Paroxysmal atrial fibrillation (Multi) - Primary    Archie continues on anticoagulation with Eliquis. No dosage adjustment required for age, weight, and renal function.         Relevant Orders    Referral to Clinical Pharmacy     Other Visit Diagnoses         Atrial fibrillation, unspecified type (Multi)                RECOMMENDATIONS/PLAN    Continue: Eliquis 5 mg BID  Follow up: 5 months    Last Appointment with Referring Provider: 11/13/2024  Next Appointment with Referring Provider: 11/19/2025  Clinical  Pharmacist follow up: 10/20/2025  VAF/Application Expiration: Yes    Date: 11/19/2025  Type of Encounter: Gunner Morales PharmD    Verbal consent to manage patient's drug therapy was obtained from the patient . They were informed they may decline to participate or withdraw from participation in pharmacy services at any time.    Continue all meds under the continuation of care with the referring provider and clinical pharmacy team.

## 2025-05-07 ENCOUNTER — APPOINTMENT (OUTPATIENT)
Dept: PHARMACY | Facility: HOSPITAL | Age: 85
End: 2025-05-07
Payer: MEDICARE

## 2025-05-07 DIAGNOSIS — I48.91 ATRIAL FIBRILLATION, UNSPECIFIED TYPE (MULTI): ICD-10-CM

## 2025-05-07 DIAGNOSIS — I48.0 PAROXYSMAL ATRIAL FIBRILLATION (MULTI): Primary | ICD-10-CM

## 2025-05-07 NOTE — Clinical Note
Kareen Watson, Today's appointment was 6 month follow up regarding anticoagulation with [facility]. Archie reports no abnormal bruising or bleeding; no recent hospitalizations or falls. He does live with his spouse, who would be able to call for medical attention if he were to fall. No changes today, will follow up in 5 months for  PAP renewal.

## 2025-05-07 NOTE — ASSESSMENT & PLAN NOTE
Archie continues on anticoagulation with Eliquis. No dosage adjustment required for age, weight, and renal function.

## 2025-05-24 DIAGNOSIS — E78.2 MIXED HYPERLIPIDEMIA: ICD-10-CM

## 2025-05-27 RX ORDER — ATORVASTATIN CALCIUM 20 MG/1
20 TABLET, FILM COATED ORAL DAILY
Qty: 90 TABLET | Refills: 3 | Status: SHIPPED | OUTPATIENT
Start: 2025-05-27

## 2025-06-02 PROCEDURE — RXMED WILLOW AMBULATORY MEDICATION CHARGE

## 2025-06-04 DIAGNOSIS — E85.4 CARDIAC AMYLOIDOSIS: ICD-10-CM

## 2025-06-04 DIAGNOSIS — I43 CARDIAC AMYLOIDOSIS: ICD-10-CM

## 2025-06-05 ENCOUNTER — APPOINTMENT (OUTPATIENT)
Dept: CARDIOLOGY | Facility: CLINIC | Age: 85
End: 2025-06-05
Payer: MEDICARE

## 2025-06-05 ENCOUNTER — HOSPITAL ENCOUNTER (OUTPATIENT)
Dept: CARDIOLOGY | Facility: CLINIC | Age: 85
Discharge: HOME | End: 2025-06-05
Payer: MEDICARE

## 2025-06-05 ENCOUNTER — PHARMACY VISIT (OUTPATIENT)
Dept: PHARMACY | Facility: CLINIC | Age: 85
End: 2025-06-05
Payer: MEDICARE

## 2025-06-05 VITALS
SYSTOLIC BLOOD PRESSURE: 131 MMHG | WEIGHT: 169 LBS | HEART RATE: 74 BPM | DIASTOLIC BLOOD PRESSURE: 81 MMHG | BODY MASS INDEX: 27.28 KG/M2 | OXYGEN SATURATION: 95 %

## 2025-06-05 DIAGNOSIS — E85.4 CARDIAC AMYLOIDOSIS: ICD-10-CM

## 2025-06-05 DIAGNOSIS — I43 CARDIAC AMYLOIDOSIS: ICD-10-CM

## 2025-06-05 DIAGNOSIS — E85.4 CARDIAC AMYLOIDOSIS: Primary | ICD-10-CM

## 2025-06-05 DIAGNOSIS — I43 CARDIAC AMYLOIDOSIS: Primary | ICD-10-CM

## 2025-06-05 PROCEDURE — 93306 TTE W/DOPPLER COMPLETE: CPT

## 2025-06-05 NOTE — PROGRESS NOTES
Heart Failure Cardiology    Archie Pedersen is a 85 y.o. male from [facility], retired , here for routine visit per trial protocol (CardioTTRansform).     He continues being stable. He describes fatigue and taking naps, but no fluid retention. He has no dyspnea on exertion walking flat surfaces. When he has to walk up hill to get the mail he gets very dyspnic.     Exam: /81 (BP Location: Left arm, Patient Position: Sitting, BP Cuff Size: Adult)   Pulse 74   Wt 76.7 kg (169 lb)   SpO2 95%   BMI 27.28 kg/m²   No JVD   Irregular rhythm  Soft holosystolic murmur  CTA  No LE edema    Current Outpatient Medications   Medication Instructions    atorvastatin (LIPITOR) 20 mg, oral, Daily    Eliquis 5 mg, oral, 2 times daily    furosemide (LASIX) 20 mg, oral, Daily    ketoconazole (NIZOral) 2 % shampoo Wash affected areas of scalp 2-3 times weekly as directed    KRILL OIL ORAL 500 mg, Daily    levothyroxine (SYNTHROID, LEVOXYL) 50 mcg, oral, Daily    magnesium oxide (MAG-OX) 250 mg, Daily    multivitamin tablet 1 tablet, Daily    propranolol (INDERAL) 30 mg, oral, 2 times daily    saw palmetto 500 mg, Daily    spironolactone (ALDACTONE) 25 mg, oral, Daily    tamsulosin (FLOMAX) 0.4 mg, oral, Daily    Vyndamax 61 mg, oral, Daily     Medical history:  -- Hypertension    Cardiovascular history:  -- wt-ATTR cardiac amyloidosis  -- Stage C HFpEF, NYHA class II-III  -- Atrial fibrillation, on DOAC    Assessment: 85 y.o. 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 He is currently stable on current regimen. He is enrolled in CARDIO-ATTransform RCT and will be transitioned to open label phase of trial soon.    Plan:  -- Continue tafamidis and clinical trial drug (eplontersen vs. Placebo)      Malachi Garcia MD, MPH  Advanced Heart Failure and Transplant Cardiology  [facility]  [facility]

## 2025-06-05 NOTE — PATIENT INSTRUCTIONS
Thank you for coming to see us today! To reach Dr. Garcia's office please call [phone]. Fax [phone]. Call [phone] to schedule an appointment. You may also contact the HF RNs at [email or web address]  (Please include your name and date of birth)  For MEDICATION REFILLS, please call [phone] option [phone] then option 1.

## 2025-06-05 NOTE — PROGRESS NOTES
Denies fatigue, chest pain, chest pressure, palpitations, shortness of breath, orthopnea, PND.   Denies headaches, dizziness, lightheadedness, and falls.    He reports LOBATO. He has mild BLE edema today.

## 2025-06-09 LAB
AORTIC VALVE PEAK VELOCITY: 0.95 M/S
AV PEAK GRADIENT: 4 MMHG
AVA (PEAK VEL): 2.04 CM2
EJECTION FRACTION APICAL 4 CHAMBER: 48.5
EJECTION FRACTION: 60 %
GLOBAL LONGITUDINAL STRAIN: 13.6 %
LEFT ATRIUM VOLUME AREA LENGTH INDEX BSA: 41.8 ML/M2
LEFT VENTRICLE INTERNAL DIMENSION DIASTOLE: 3.44 CM (ref 3.5–6)
LEFT VENTRICULAR OUTFLOW TRACT DIAMETER: 1.76 CM
MITRAL VALVE E/A RATIO: 4
RIGHT VENTRICLE FREE WALL PEAK S': 6 CM/S
RIGHT VENTRICLE PEAK SYSTOLIC PRESSURE: 53 MMHG
TRICUSPID ANNULAR PLANE SYSTOLIC EXCURSION: 1 CM

## 2025-06-10 DIAGNOSIS — I50.30 DIASTOLIC HEART FAILURE, STAGE C: ICD-10-CM

## 2025-06-10 RX ORDER — FUROSEMIDE 20 MG/1
20 TABLET ORAL DAILY
Qty: 90 TABLET | Refills: 3 | Status: SHIPPED | OUTPATIENT
Start: 2025-06-10 | End: 2026-06-10

## 2025-06-10 RX ORDER — FUROSEMIDE 20 MG/1
20 TABLET ORAL DAILY
Qty: 90 TABLET | Refills: 3 | Status: SHIPPED | OUTPATIENT
Start: 2025-06-10 | End: 2025-06-10 | Stop reason: SDUPTHER

## 2025-07-03 ENCOUNTER — TELEMEDICINE CLINICAL SUPPORT (OUTPATIENT)
Dept: PHARMACY | Facility: HOSPITAL | Age: 85
End: 2025-07-03
Payer: MEDICARE

## 2025-07-03 NOTE — PROGRESS NOTES
"[facility] Clinical Note  Patient Reassessment     Introduction  Archie Pedersen is a 85 y.o. male who is on the specialty pharmacy service for management of: Cardiology Core.      [facility] supplied medication: Vyndamax 61mg by mouth once daily    Duration of therapy: Maintenance    The most recent encounter visit with the referring prescriber Malachi Garcia MD MPH on 6/5/25 was reviewed.  Pharmacy will continue to collaborate in the care of this patient with the referring prescriber.    Discussion  Archie was contacted on 7/3/2025 at 10:10 AM for a pharmacy visit with encounter number [id number] from:   [facility]  [address]  [facility] [place] [address]  [facility] [address]  Dept: [phone]  Dept Fax: [phone]  Loc: [phone]  Spouse consented to a/an Telephone visit, which was performed.    Efficacy  Patient has developed new symptoms of condition: No  Patient/caregiver feels medication is affecting the disease state: Seems to be doing well overall. Per wife, it is \"hard to say\" if his quality of life is improved at all since being on Vyndamax    Goals  Provided education on goals and possible outcomes of therapy:  Adherence with therapy  Timely completion of appropriate labs  Timely and appropriate follow up with provider  Identify and address medication interactions with presciption medications, OTC medications and supplements  Optimize or maintain quality of life  Cardiology: Slow progression of disease  Prolong life  Patient has documented target(s) for goals of therapy: No    Tolerance  Patient has experienced side effects from this medication: No  Changes to current therapy regimen: No    The follow-up timeline was discussed. Every person responds to and reacts to therapy differently. Patient should be assessed for efficacy and tolerability in approximately: annually    Adherence  Patient Information  Informant: " "Spouse  Demonstrates Understanding of Importance of Adherence: Yes  Does the patient have any barriers to self-administration (including physical and mental?): No  Medication Information  Medication: tafamidis (Vyndamax)  Patient Reported Missed Doses in the Last 4 Weeks: 0  Estimated Medication Adherence Level: %  Adherence Estimation Source: Claims history  Barriers to Adherence: No Problems identified  What concerns do you have regarding your medications?: none   The importance of adherence was discussed and patient/caregiver was advised to take the medication as prescribed by their provider. Encouraged patient/caregiver to call physician's office or specialty pharmacy if they have a question regarding a missed dose.    General Assessment  Changes to home medications, OTCs or supplements: No  Current Medications[1]  Reported new allergies: No  Reported new medical conditions: No  Additional monitoring reviewed: Cardiology - No Labs Needed: There are no routine laboratory monitoring parameters for this medication  Is laboratory follow up needed? No    Advised to contact the pharmacy if there are any changes to the patient's medication list, including prescriptions, OTC medications, herbal products, or supplements.    Impression/Plan  This patient has been identified as high risk due to Geriatric (over 65 years of age).  The following action was taken: Patient/caregiver encouraged to participate in patient management program.    QOL/Patient Satisfaction  Rate your quality of life on scale of 1-10:  (\"Hard to say\")  Rate your satisfaction with  Specialty Pharmacy on scale of 1-10: 10 - Completely satisfied    Provided contact information ([phone]) for [facility] and reviewed dispensing process, refill timeline and patient management follow up. Confirmed understanding of education conducted during assessment. All questions and concerns were addressed and patient/caregiver was " encouraged to reach out for additional questions or concerns.    Based on the patient's diagnosis, medication list, progress towards goals, adherence, tolerance, and medication list, medication remains appropriate: Therapy remains appropriate (I attest)    Razia Dave, PharmD        [1]   Current Outpatient Medications   Medication Sig Dispense Refill    apixaban (Eliquis) 5 mg tablet Take 1 tablet (5 mg) by mouth 2 times a day. 180 tablet 3    atorvastatin (Lipitor) 20 mg tablet TAKE ONE TABLET BY MOUTH ONCE DAILY 90 tablet 3    furosemide (Lasix) 20 mg tablet Take 1 tablet (20 mg) by mouth once daily. 90 tablet 3    ketoconazole (NIZOral) 2 % shampoo Wash affected areas of scalp 2-3 times weekly as directed 120 mL 11    KRILL OIL ORAL Take 500 mg by mouth once daily.      levothyroxine (Synthroid, Levoxyl) 50 mcg tablet TAKE ONE TABLET BY MOUTH DAILY 90 tablet 3    magnesium oxide (Mag-Ox) 250 mg magnesium tablet Take 1 tablet (250 mg) by mouth once daily.      multivitamin tablet Take 1 tablet by mouth once daily.      propranolol (Inderal) 20 mg tablet Take 1.5 tablets (30 mg) by mouth 2 times a day. 270 tablet 3    saw palmetto 500 mg capsule Take 1 capsule (500 mg) by mouth once daily.      spironolactone (Aldactone) 25 mg tablet TAKE ONE TABLET BY MOUTH EVERY DAY 90 tablet 3    [START ON 7/4/2025] Study [id number]/placebo 150 mg/mL syringe Inject 0.3mL (45mg or placebo) subcutaneously every 4 weeks. First dose in clinic. Second and third dose at home. BUD 2 hours at room temp. 24 hours refrigerated. 0.3 mL 0    tafamidis (Vyndamax) 61 mg capsule Take 1 capsule (61 mg) by mouth once daily. 90 capsule 1    tamsulosin (Flomax) 0.4 mg 24 hr capsule TAKE ONE CAPSULE BY MOUTH EVERY DAY 90 capsule 3     Current Facility-Administered Medications   Medication Dose Route Frequency Provider Last Rate Last Admin    Study [id number]/placebo syringe 45 mg  45 mg subcutaneous Once Malachi RENAE  MD Jose MPH        Study [id number]/placebo syringe 45 mg  45 mg subcutaneous Once Malachi Garcia MD MPH        Study [id number]/placebo syringe 45 mg  45 mg subcutaneous Once Malachi Garcia MD MPH        Study [id number]/placebo syringe 45 mg  45 mg subcutaneous Once Malachi Garcia MD MPH        Study [id number]/placebo syringe 45 mg  45 mg subcutaneous Once Malachi Garcia MD MPH        Study [id number]/placebo syringe 45 mg  45 mg subcutaneous Once Malachi Garcia MD MPH        Study [id number]/placebo syringe 45 mg  45 mg subcutaneous Once Malachi Garcia MD MPH        Study [id number]/placebo syringe 45 mg  45 mg subcutaneous Once Malachi Garcia MD MPH        Study [id number]/placebo syringe 45 mg  45 mg subcutaneous Once Malachi Garcia MD MPH        Study [id number]/placebo syringe 45 mg  45 mg subcutaneous Once Malachi Garcia MD MPH        Study [id number]/placebo syringe 45 mg  45 mg subcutaneous Once Malachi Garcia MD MPH        Study [id number]/placebo syringe 45 mg  45 mg subcutaneous Once Mlaachi Garcia MD MPH        Study [id number]/placebo syringe 45 mg  45 mg subcutaneous Once Malachi Garcia MD MPH        Study [id number]/placebo syringe 45 mg  45 mg subcutaneous Once Malachi Garcia MD MPH        Study [id number]/placebo syringe 45 mg  45 mg subcutaneous Once Malachi Garcia MD MPH        Study [id number]/placebo syringe 45 mg  45 mg subcutaneous Once Malachi Garcia MD MPH        Study [id number]/placebo syringe 45 mg  45 mg subcutaneous Once Malachi Garcia MD MPH

## 2025-07-08 LAB
ALBUMIN SERPL-MCNC: 4.5 G/DL (ref 3.6–5.1)
ALP SERPL-CCNC: 91 U/L (ref 35–144)
ALT SERPL-CCNC: 12 U/L (ref 9–46)
ANION GAP SERPL CALCULATED.4IONS-SCNC: 10 MMOL/L (CALC) (ref 7–17)
AST SERPL-CCNC: 23 U/L (ref 10–35)
BASOPHILS # BLD AUTO: 78 CELLS/UL (ref 0–200)
BASOPHILS NFR BLD AUTO: 1.2 %
BILIRUB SERPL-MCNC: 1.1 MG/DL (ref 0.2–1.2)
BUN SERPL-MCNC: 21 MG/DL (ref 7–25)
CALCIUM SERPL-MCNC: 9.7 MG/DL (ref 8.6–10.3)
CHLORIDE SERPL-SCNC: 101 MMOL/L (ref 98–110)
CHOLEST SERPL-MCNC: 113 MG/DL
CHOLEST/HDLC SERPL: 2.3 (CALC)
CO2 SERPL-SCNC: 28 MMOL/L (ref 20–32)
CREAT SERPL-MCNC: 1.29 MG/DL (ref 0.7–1.22)
EGFRCR SERPLBLD CKD-EPI 2021: 55 ML/MIN/1.73M2
EOSINOPHIL # BLD AUTO: 241 CELLS/UL (ref 15–500)
EOSINOPHIL NFR BLD AUTO: 3.7 %
ERYTHROCYTE [DISTWIDTH] IN BLOOD BY AUTOMATED COUNT: 12.9 % (ref 11–15)
EST. AVERAGE GLUCOSE BLD GHB EST-MCNC: 126 MG/DL
EST. AVERAGE GLUCOSE BLD GHB EST-SCNC: 7 MMOL/L
GLUCOSE SERPL-MCNC: 102 MG/DL (ref 65–99)
HBA1C MFR BLD: 6 %
HCT VFR BLD AUTO: 47.1 % (ref 38.5–50)
HDLC SERPL-MCNC: 49 MG/DL
HGB BLD-MCNC: 15.3 G/DL (ref 13.2–17.1)
LDLC SERPL CALC-MCNC: 46 MG/DL (CALC)
LYMPHOCYTES # BLD AUTO: 1053 CELLS/UL (ref 850–3900)
LYMPHOCYTES NFR BLD AUTO: 16.2 %
MCH RBC QN AUTO: 31.3 PG (ref 27–33)
MCHC RBC AUTO-ENTMCNC: 32.5 G/DL (ref 32–36)
MCV RBC AUTO: 96.3 FL (ref 80–100)
MONOCYTES # BLD AUTO: 670 CELLS/UL (ref 200–950)
MONOCYTES NFR BLD AUTO: 10.3 %
NEUTROPHILS # BLD AUTO: 4459 CELLS/UL (ref 1500–7800)
NEUTROPHILS NFR BLD AUTO: 68.6 %
NONHDLC SERPL-MCNC: 64 MG/DL (CALC)
PLATELET # BLD AUTO: 221 THOUSAND/UL (ref 140–400)
PMV BLD REES-ECKER: 9.8 FL (ref 7.5–12.5)
POTASSIUM SERPL-SCNC: 4.9 MMOL/L (ref 3.5–5.3)
PROT SERPL-MCNC: 7.1 G/DL (ref 6.1–8.1)
PSA SERPL-MCNC: 1.2 NG/ML
RBC # BLD AUTO: 4.89 MILLION/UL (ref 4.2–5.8)
SODIUM SERPL-SCNC: 139 MMOL/L (ref 135–146)
TRIGL SERPL-MCNC: 96 MG/DL
TSH SERPL-ACNC: 2.43 MIU/L (ref 0.4–4.5)
WBC # BLD AUTO: 6.5 THOUSAND/UL (ref 3.8–10.8)

## 2025-07-10 ENCOUNTER — APPOINTMENT (OUTPATIENT)
Dept: PRIMARY CARE | Facility: CLINIC | Age: 85
End: 2025-07-10
Payer: MEDICARE

## 2025-07-10 VITALS
TEMPERATURE: 98.2 F | WEIGHT: 172 LBS | SYSTOLIC BLOOD PRESSURE: 116 MMHG | BODY MASS INDEX: 27.64 KG/M2 | HEIGHT: 66 IN | OXYGEN SATURATION: 96 % | RESPIRATION RATE: 16 BRPM | HEART RATE: 66 BPM | DIASTOLIC BLOOD PRESSURE: 74 MMHG

## 2025-07-10 DIAGNOSIS — E03.8 OTHER SPECIFIED HYPOTHYROIDISM: ICD-10-CM

## 2025-07-10 DIAGNOSIS — H10.9 BACTERIAL CONJUNCTIVITIS: ICD-10-CM

## 2025-07-10 DIAGNOSIS — Z00.00 ENCOUNTER FOR ANNUAL WELLNESS EXAM IN MEDICARE PATIENT: Primary | ICD-10-CM

## 2025-07-10 DIAGNOSIS — G20.A1 PARKINSON'S DISEASE WITHOUT DYSKINESIA OR FLUCTUATING MANIFESTATIONS: ICD-10-CM

## 2025-07-10 DIAGNOSIS — K59.00 CONSTIPATION, UNSPECIFIED CONSTIPATION TYPE: ICD-10-CM

## 2025-07-10 DIAGNOSIS — E85.82 WILD-TYPE TRANSTHYRETIN-RELATED (ATTR) AMYLOIDOSIS (MULTI): ICD-10-CM

## 2025-07-10 DIAGNOSIS — I48.0 PAROXYSMAL ATRIAL FIBRILLATION (MULTI): ICD-10-CM

## 2025-07-10 DIAGNOSIS — R73.03 PREDIABETES: ICD-10-CM

## 2025-07-10 DIAGNOSIS — G47.9 SLEEP DISTURBANCE: ICD-10-CM

## 2025-07-10 DIAGNOSIS — E78.5 HYPERLIPIDEMIA, UNSPECIFIED HYPERLIPIDEMIA TYPE: ICD-10-CM

## 2025-07-10 DIAGNOSIS — E03.9 HYPOTHYROIDISM, UNSPECIFIED TYPE: ICD-10-CM

## 2025-07-10 DIAGNOSIS — G25.0 ESSENTIAL TREMOR: ICD-10-CM

## 2025-07-10 DIAGNOSIS — M25.561 PAIN IN BOTH KNEES, UNSPECIFIED CHRONICITY: ICD-10-CM

## 2025-07-10 DIAGNOSIS — N18.30 CHRONIC KIDNEY DISEASE (CKD), ACTIVE MEDICAL MANAGEMENT WITHOUT DIALYSIS, STAGE 3 (MODERATE) (MULTI): ICD-10-CM

## 2025-07-10 DIAGNOSIS — I10 HYPERTENSION, UNSPECIFIED TYPE: ICD-10-CM

## 2025-07-10 DIAGNOSIS — M25.562 PAIN IN BOTH KNEES, UNSPECIFIED CHRONICITY: ICD-10-CM

## 2025-07-10 RX ORDER — TOBRAMYCIN 3 MG/ML
2 SOLUTION/ DROPS OPHTHALMIC EVERY 4 HOURS
Qty: 5 ML | Refills: 0 | Status: SHIPPED | OUTPATIENT
Start: 2025-07-10 | End: 2025-07-15

## 2025-07-10 RX ORDER — PROPRANOLOL HYDROCHLORIDE 20 MG/1
30 TABLET ORAL 2 TIMES DAILY
Qty: 270 TABLET | Refills: 2 | Status: SHIPPED | OUTPATIENT
Start: 2025-07-10 | End: 2025-07-10 | Stop reason: SDUPTHER

## 2025-07-10 RX ORDER — PROPRANOLOL HYDROCHLORIDE 20 MG/1
30 TABLET ORAL 2 TIMES DAILY
Qty: 270 TABLET | Refills: 3 | Status: SHIPPED | OUTPATIENT
Start: 2025-07-10

## 2025-07-10 ASSESSMENT — ENCOUNTER SYMPTOMS
LOSS OF SENSATION IN FEET: 1
OCCASIONAL FEELINGS OF UNSTEADINESS: 1
DEPRESSION: 0

## 2025-07-10 ASSESSMENT — PATIENT HEALTH QUESTIONNAIRE - PHQ9
9. THOUGHTS THAT YOU WOULD BE BETTER OFF DEAD, OR OF HURTING YOURSELF: NOT AT ALL
8. MOVING OR SPEAKING SO SLOWLY THAT OTHER PEOPLE COULD HAVE NOTICED. OR THE OPPOSITE, BEING SO FIGETY OR RESTLESS THAT YOU HAVE BEEN MOVING AROUND A LOT MORE THAN USUAL: NOT AT ALL
5. POOR APPETITE OR OVEREATING: NOT AT ALL
7. TROUBLE CONCENTRATING ON THINGS, SUCH AS READING THE NEWSPAPER OR WATCHING TELEVISION: MORE THAN HALF THE DAYS
SUM OF ALL RESPONSES TO PHQ9 QUESTIONS 1 AND 2: 5
SUM OF ALL RESPONSES TO PHQ QUESTIONS 1-9: 9
4. FEELING TIRED OR HAVING LITTLE ENERGY: MORE THAN HALF THE DAYS
3. TROUBLE FALLING OR STAYING ASLEEP OR SLEEPING TOO MUCH: NOT AT ALL
1. LITTLE INTEREST OR PLEASURE IN DOING THINGS: MORE THAN HALF THE DAYS
2. FEELING DOWN, DEPRESSED OR HOPELESS: NEARLY EVERY DAY
6. FEELING BAD ABOUT YOURSELF - OR THAT YOU ARE A FAILURE OR HAVE LET YOURSELF OR YOUR FAMILY DOWN: NOT AT ALL

## 2025-07-10 ASSESSMENT — ACTIVITIES OF DAILY LIVING (ADL)
TAKING_MEDICATION: INDEPENDENT
BATHING: INDEPENDENT
GROCERY_SHOPPING: INDEPENDENT
MANAGING_FINANCES: INDEPENDENT
DRESSING: INDEPENDENT
DOING_HOUSEWORK: INDEPENDENT

## 2025-07-21 ENCOUNTER — TELEPHONE (OUTPATIENT)
Dept: PRIMARY CARE | Facility: CLINIC | Age: 85
End: 2025-07-21
Payer: MEDICARE

## 2025-07-23 ENCOUNTER — SPECIALTY PHARMACY (OUTPATIENT)
Dept: PHARMACY | Facility: CLINIC | Age: 85
End: 2025-07-23

## 2025-07-23 ENCOUNTER — OFFICE VISIT (OUTPATIENT)
Dept: PRIMARY CARE | Facility: CLINIC | Age: 85
End: 2025-07-23
Payer: MEDICARE

## 2025-07-23 VITALS
DIASTOLIC BLOOD PRESSURE: 77 MMHG | TEMPERATURE: 98.6 F | HEART RATE: 86 BPM | OXYGEN SATURATION: 97 % | WEIGHT: 173.6 LBS | HEIGHT: 66 IN | RESPIRATION RATE: 20 BRPM | BODY MASS INDEX: 27.9 KG/M2 | SYSTOLIC BLOOD PRESSURE: 142 MMHG

## 2025-07-23 DIAGNOSIS — E85.4 CARDIAC AMYLOIDOSIS: ICD-10-CM

## 2025-07-23 DIAGNOSIS — D04.5 CARCINOMA IN SITU OF SKIN OF TRUNK: ICD-10-CM

## 2025-07-23 DIAGNOSIS — N63.20 MASS OF LEFT BREAST, UNSPECIFIED QUADRANT: Primary | ICD-10-CM

## 2025-07-23 DIAGNOSIS — D49.2 NEOPLASM OF UNSPECIFIED BEHAVIOR OF BONE, SOFT TISSUE, AND SKIN: ICD-10-CM

## 2025-07-23 DIAGNOSIS — I43 CARDIAC AMYLOIDOSIS: ICD-10-CM

## 2025-07-23 NOTE — PROGRESS NOTES
Subjective   Patient ID: Archie Pedersen is a 85 y.o. male who presents for Lump in left breast.    HPI    Kamran is here with his wife today.  They noted a nontender quarter size mass under his left nipple this past Saturday, July 19, 2025.  The also noted asymmetry, left breast is larger than the right as well.  Denies any discharge from nipple.      No unintentional weight loss.  However is with weight gain of 3 pounds from June 5 through July 10 and from July 10 through July 23 1 pound.  Today he is 173 pounds.  On Lasix 20 mg daily.  Continues to have swelling in both lower extremities.  History of vein ablation.    Is with cardiac amyloidosis and enrolled in a research study.  Receives placebo/medication subcutaneously from the study.  Follows up every 3 months with study.  History of skin cancer to trunk.    Medication Documentation Review Audit       Reviewed by FRANCISCO JAVIER Ngo (Nurse Practitioner) on 07/23/25 at 1427      Medication Order Taking? Sig Documenting Provider Last Dose Status   apixaban (Eliquis) 5 mg tablet [id number]  Take 1 tablet (5 mg) by mouth 2 times a day. FRANCISCO JAVIER Giron  Active   atorvastatin (Lipitor) 20 mg tablet [id number]  TAKE ONE TABLET BY MOUTH ONCE DAILY Brittany Behm, DO  Active   furosemide (Lasix) 20 mg tablet [id number]  Take 1 tablet (20 mg) by mouth once daily. Brittany Behm, DO  Active   ketoconazole (NIZOral) 2 % shampoo [id number]  Wash affected areas of scalp 2-3 times weekly as directed Ye Frederick MD  Active   KRILL OIL ORAL [id number] No Take 500 mg by mouth once daily. Historical Provider, MD Taking Active   levothyroxine (Synthroid, Levoxyl) 50 mcg tablet [id number]  TAKE ONE TABLET BY MOUTH DAILY FRANCISCO JAVIER Ngo  Active   magnesium oxide (Mag-Ox) 250 mg magnesium tablet [id number] No Take 1 tablet (250 mg) by mouth once daily. Historical Provider, MD Taking Active   multivitamin tablet [id number] No Take 1 tablet by mouth once daily. Historical  MD Elise Taking Active   propranolol (Inderal) 20 mg tablet [id number]  Take 1.5 tablets (30 mg) by mouth 2 times a day. Brittany Behm, DO  Active   saw palmetto 500 mg capsule [id number] No Take 1 capsule (500 mg) by mouth once daily. Historical MD Elise Taking Active   spironolactone (Aldactone) 25 mg tablet [id number]  TAKE ONE TABLET BY MOUTH EVERY DAY Brittany Behm, DO  Active   Study [id number]/placebo syringe 45 mg [id number]   Malachi Garcia MD MPH  Active   Study [id number]/placebo syringe 45 mg [id number]   Malachi Garcia MD MPH  Active   Study [id number]/placebo syringe 45 mg [id number]   Malachi Garica MD MPH  Active   Study [id number]/placebo syringe 45 mg [id number]   Malachi Garcia MD [employer]  Active   Study [id number]/placebo syringe 45 mg [id number]   Malachi Garcia MD MPH  Active   Study [id number]/placebo syringe 45 mg [id number]   Malachi Garcia MD MPH  Active   Study [id number]/placebo syringe 45 mg [id number]   Malachi Garcia MD MPH  Active   Study [id number]/placebo syringe 45 mg [id number]   Malachi Garcia MD MPH  Active   Study [id number]/placebo syringe 45 mg [id number]   Malachi Garcia MD MPH  Active   Study [id number]/placebo syringe 45 mg [id number]   Malachi Garcia MD MPH  Active   Study [id number]/placebo syringe 45 mg [id number]   Malachi Garcia MD MPH  Active   Study [id number]/placebo syringe 45 mg [id number]   Malachi Garcia MD MPH  Active   Study [id number]/placebo syringe 45 mg [id number]   Malachi Garcia MD MPH  Active   Study [id number]/placebo syringe 45 mg [id number]   Malachi Garcia MD MPH  Active   Study [id number]/placebo syringe 45 mg [id number]   Malachi Garcia MD MPH  Active   Study [id number]/placebo syringe 45 mg  "[id number]   Malachi Garcia MD MPH  Active   Study [id number]/placebo syringe 45 mg [id number]   Malachi Garcia MD MPH  Active   tafamidis (Vyndamax) 61 mg capsule [id number]  Take 1 capsule (61 mg) by mouth once daily. Malachi Garcia MD MPH  Active   tamsulosin (Flomax) 0.4 mg 24 hr capsule [id number]  TAKE ONE CAPSULE BY MOUTH EVERY DAY Brittany Behm, DO  Active                     Vitals:    07/23/25 1355   BP: 142/77   Pulse: 86   Resp: 20   Temp: 37 °C (98.6 °F)   TempSrc: Temporal   SpO2: 97%   Weight: 78.7 kg (173 lb 9.6 oz)   Height: 1.676 m (5' 6\")      Body mass index is 28.02 kg/m².     Review of Systems   All other systems reviewed and are negative.  And what is in the history of present illness.    Objective   Physical Exam  Vitals and nursing note reviewed.   Constitutional:       Appearance: Normal appearance.     Cardiovascular:      Pulses: Normal pulses.   Pulmonary:      Effort: Pulmonary effort is normal.     Musculoskeletal:      Right lower leg: Edema present.      Left lower leg: Edema present.      Comments: +1 to +2 pitting edema lower extremities     Skin:     General: Skin is warm and dry.      Capillary Refill: Capillary refill takes less than 2 seconds.      Findings: Lesion present.      Comments: Nonmobile, nontender, quarter size mass underneath left nipple.    Asymmetry of larger breast to left compared to right.     Neurological:      Mental Status: He is alert and oriented to person, place, and time.     Psychiatric:         Mood and Affect: Mood normal.         Behavior: Behavior normal.               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

## 2025-07-24 PROCEDURE — RXMED WILLOW AMBULATORY MEDICATION CHARGE

## 2025-07-28 ENCOUNTER — HOSPITAL ENCOUNTER (OUTPATIENT)
Dept: RADIOLOGY | Facility: CLINIC | Age: 85
Discharge: HOME | End: 2025-07-28
Payer: MEDICARE

## 2025-07-28 VITALS — BODY MASS INDEX: 27.88 KG/M2 | HEIGHT: 66 IN | WEIGHT: 173.5 LBS

## 2025-07-28 DIAGNOSIS — N63.20 MASS OF LEFT BREAST, UNSPECIFIED QUADRANT: ICD-10-CM

## 2025-07-28 DIAGNOSIS — D04.5 CARCINOMA IN SITU OF SKIN OF TRUNK: ICD-10-CM

## 2025-07-28 DIAGNOSIS — D49.2 NEOPLASM OF UNSPECIFIED BEHAVIOR OF BONE, SOFT TISSUE, AND SKIN: ICD-10-CM

## 2025-07-28 PROCEDURE — 77066 DX MAMMO INCL CAD BI: CPT

## 2025-07-29 ENCOUNTER — RESULTS FOLLOW-UP (OUTPATIENT)
Dept: PRIMARY CARE | Facility: CLINIC | Age: 85
End: 2025-07-29
Payer: MEDICARE

## 2025-07-29 NOTE — TELEPHONE ENCOUNTER
Telephoned can and left voicemail regarding results of diagnostic mammogram imaging without suspicious masses or calcifications.  No further recommendation or imaging is at this time.

## 2025-07-30 ENCOUNTER — PHARMACY VISIT (OUTPATIENT)
Dept: PHARMACY | Facility: CLINIC | Age: 85
End: 2025-07-30
Payer: MEDICARE

## 2025-08-11 ENCOUNTER — OFFICE VISIT (OUTPATIENT)
Dept: PRIMARY CARE | Facility: CLINIC | Age: 85
End: 2025-08-11
Payer: MEDICARE

## 2025-08-11 VITALS
WEIGHT: 168.9 LBS | SYSTOLIC BLOOD PRESSURE: 124 MMHG | HEART RATE: 96 BPM | BODY MASS INDEX: 27.14 KG/M2 | RESPIRATION RATE: 16 BRPM | DIASTOLIC BLOOD PRESSURE: 74 MMHG | TEMPERATURE: 98.5 F | HEIGHT: 66 IN | OXYGEN SATURATION: 97 %

## 2025-08-11 DIAGNOSIS — R60.0 LOWER EXTREMITY EDEMA: Primary | ICD-10-CM

## 2025-08-11 DIAGNOSIS — L13.9 ACUTE BULLOUS DERMATITIS: ICD-10-CM

## 2025-08-11 PROCEDURE — 3074F SYST BP LT 130 MM HG: CPT | Performed by: FAMILY MEDICINE

## 2025-08-11 PROCEDURE — 99214 OFFICE O/P EST MOD 30 MIN: CPT | Performed by: FAMILY MEDICINE

## 2025-08-11 PROCEDURE — 3078F DIAST BP <80 MM HG: CPT | Performed by: FAMILY MEDICINE

## 2025-08-11 PROCEDURE — G2211 COMPLEX E/M VISIT ADD ON: HCPCS | Performed by: FAMILY MEDICINE

## 2025-08-11 PROCEDURE — 1159F MED LIST DOCD IN RCRD: CPT | Performed by: FAMILY MEDICINE

## 2025-08-11 RX ORDER — FUROSEMIDE 40 MG/1
40 TABLET ORAL DAILY
Qty: 30 TABLET | Refills: 0 | Status: SHIPPED | OUTPATIENT
Start: 2025-08-11 | End: 2026-08-11

## 2025-08-11 RX ORDER — POTASSIUM CHLORIDE 20 MEQ/1
20 TABLET, EXTENDED RELEASE ORAL DAILY
Qty: 5 TABLET | Refills: 0 | Status: SHIPPED | OUTPATIENT
Start: 2025-08-11 | End: 2025-08-16

## 2025-08-18 ENCOUNTER — APPOINTMENT (OUTPATIENT)
Facility: CLINIC | Age: 85
End: 2025-08-18
Payer: MEDICARE

## 2025-08-18 ENCOUNTER — HOSPITAL ENCOUNTER (OUTPATIENT)
Dept: RADIOLOGY | Facility: CLINIC | Age: 85
Discharge: HOME | End: 2025-08-18
Payer: MEDICARE

## 2025-08-18 ENCOUNTER — TELEPHONE (OUTPATIENT)
Dept: ORTHOPEDIC SURGERY | Facility: HOSPITAL | Age: 85
End: 2025-08-18

## 2025-08-18 VITALS — BODY MASS INDEX: 27.14 KG/M2 | WEIGHT: 168.87 LBS | HEIGHT: 66 IN

## 2025-08-18 DIAGNOSIS — M25.561 ACUTE PAIN OF BOTH KNEES: ICD-10-CM

## 2025-08-18 DIAGNOSIS — M25.562 ACUTE PAIN OF BOTH KNEES: ICD-10-CM

## 2025-08-18 DIAGNOSIS — M17.0 PRIMARY OSTEOARTHRITIS OF BOTH KNEES: Primary | ICD-10-CM

## 2025-08-18 PROCEDURE — 73564 X-RAY EXAM KNEE 4 OR MORE: CPT | Mod: 50

## 2025-08-18 ASSESSMENT — PAIN - FUNCTIONAL ASSESSMENT: PAIN_FUNCTIONAL_ASSESSMENT: 0-10

## 2025-08-18 ASSESSMENT — PAIN SCALES - GENERAL: PAINLEVEL_OUTOF10: 2

## 2025-08-19 ENCOUNTER — APPOINTMENT (OUTPATIENT)
Dept: DERMATOLOGY | Facility: CLINIC | Age: 85
End: 2025-08-19
Payer: MEDICARE

## 2025-08-19 DIAGNOSIS — Z85.828 HISTORY OF NONMELANOMA SKIN CANCER: ICD-10-CM

## 2025-08-19 DIAGNOSIS — L82.0 INFLAMED SEBORRHEIC KERATOSIS: Primary | ICD-10-CM

## 2025-08-19 DIAGNOSIS — L57.0 ACTINIC KERATOSIS: ICD-10-CM

## 2025-08-19 DIAGNOSIS — L57.8 DIFFUSE PHOTODAMAGE OF SKIN: ICD-10-CM

## 2025-08-19 DIAGNOSIS — I87.2 VENOUS STASIS DERMATITIS OF LEFT LOWER EXTREMITY: ICD-10-CM

## 2025-08-19 DIAGNOSIS — D22.5 MELANOCYTIC NEVUS OF TRUNK: ICD-10-CM

## 2025-08-19 DIAGNOSIS — L82.1 SEBORRHEIC KERATOSIS: ICD-10-CM

## 2025-08-19 DIAGNOSIS — D18.01 HEMANGIOMA OF SKIN: ICD-10-CM

## 2025-08-19 RX ORDER — TRIAMCINOLONE ACETONIDE 1 MG/G
CREAM TOPICAL
Qty: 80 G | Refills: 0 | Status: SHIPPED | OUTPATIENT
Start: 2025-08-19

## 2025-08-19 ASSESSMENT — DERMATOLOGY PATIENT ASSESSMENT
DO YOU USE A TANNING BED: NO
HAVE YOU HAD OR DO YOU HAVE A STAPH INFECTION: NO
DO YOU HAVE ANY NEW OR CHANGING LESIONS: YES
HAVE YOU HAD OR DO YOU HAVE VASCULAR DISEASE: NO
ARE YOU AN ORGAN TRANSPLANT RECIPIENT: NO

## 2025-08-19 ASSESSMENT — DERMATOLOGY QUALITY OF LIFE (QOL) ASSESSMENT
RATE HOW EMOTIONALLY BOTHERED YOU ARE BY YOUR SKIN PROBLEM (FOR EXAMPLE, WORRY, EMBARRASSMENT, FRUSTRATION): 0 - NEVER BOTHERED
RATE HOW BOTHERED YOU ARE BY SYMPTOMS OF YOUR SKIN PROBLEM (EG, ITCHING, STINGING BURNING, HURTING OR SKIN IRRITATION): 0 - NEVER BOTHERED
RATE HOW BOTHERED YOU ARE BY SYMPTOMS OF YOUR SKIN PROBLEM (EG, ITCHING, STINGING BURNING, HURTING OR SKIN IRRITATION): 0 - NEVER BOTHERED
RATE HOW EMOTIONALLY BOTHERED YOU ARE BY YOUR SKIN PROBLEM (FOR EXAMPLE, WORRY, EMBARRASSMENT, FRUSTRATION): 0 - NEVER BOTHERED
RATE HOW BOTHERED YOU ARE BY EFFECTS OF YOUR SKIN PROBLEMS ON YOUR ACTIVITIES (EG, GOING OUT, ACCOMPLISHING WHAT YOU WANT, WORK ACTIVITIES OR YOUR RELATIONSHIPS WITH OTHERS): 0 - NEVER BOTHERED
WHAT SINGLE SKIN CONDITION LISTED BELOW IS THE PATIENT ANSWERING THE QUALITY-OF-LIFE ASSESSMENT QUESTIONS ABOUT: NONE OF THE ABOVE
ARE THERE EXCLUSIONS OR EXCEPTIONS FOR THE QUALITY OF LIFE ASSESSMENT: NO
RATE HOW BOTHERED YOU ARE BY SYMPTOMS OF YOUR SKIN PROBLEM (EG, ITCHING, STINGING BURNING, HURTING OR SKIN IRRITATION): 0 - NEVER BOTHERED
DATE THE QUALITY-OF-LIFE ASSESSMENT WAS COMPLETED: 67436
RATE HOW BOTHERED YOU ARE BY EFFECTS OF YOUR SKIN PROBLEMS ON YOUR ACTIVITIES (EG, GOING OUT, ACCOMPLISHING WHAT YOU WANT, WORK ACTIVITIES OR YOUR RELATIONSHIPS WITH OTHERS): 0 - NEVER BOTHERED
RATE HOW EMOTIONALLY BOTHERED YOU ARE BY YOUR SKIN PROBLEM (FOR EXAMPLE, WORRY, EMBARRASSMENT, FRUSTRATION): 0 - NEVER BOTHERED
RATE HOW BOTHERED YOU ARE BY EFFECTS OF YOUR SKIN PROBLEMS ON YOUR ACTIVITIES (EG, GOING OUT, ACCOMPLISHING WHAT YOU WANT, WORK ACTIVITIES OR YOUR RELATIONSHIPS WITH OTHERS): 0 - NEVER BOTHERED

## 2025-08-19 ASSESSMENT — PATIENT GLOBAL ASSESSMENT (PGA): PATIENT GLOBAL ASSESSMENT: PATIENT GLOBAL ASSESSMENT:  1 - CLEAR

## 2025-08-19 ASSESSMENT — ITCH NUMERIC RATING SCALE: HOW SEVERE IS YOUR ITCHING?: 0

## 2025-08-20 PROCEDURE — RXMED WILLOW AMBULATORY MEDICATION CHARGE

## 2025-08-25 ENCOUNTER — SPECIALTY PHARMACY (OUTPATIENT)
Dept: PHARMACY | Facility: CLINIC | Age: 85
End: 2025-08-25

## 2025-08-26 ENCOUNTER — PHARMACY VISIT (OUTPATIENT)
Dept: PHARMACY | Facility: CLINIC | Age: 85
End: 2025-08-26
Payer: MEDICARE

## 2025-08-26 ENCOUNTER — APPOINTMENT (OUTPATIENT)
Dept: PRIMARY CARE | Facility: CLINIC | Age: 85
End: 2025-08-26
Payer: MEDICARE

## 2025-08-27 PROCEDURE — RXMED WILLOW AMBULATORY MEDICATION CHARGE

## 2025-08-28 ENCOUNTER — OFFICE VISIT (OUTPATIENT)
Dept: CARDIOLOGY | Facility: HOSPITAL | Age: 85
End: 2025-08-28
Payer: MEDICARE

## 2025-08-28 ENCOUNTER — PHARMACY VISIT (OUTPATIENT)
Dept: PHARMACY | Facility: CLINIC | Age: 85
End: 2025-08-28
Payer: MEDICARE

## 2025-08-28 VITALS
OXYGEN SATURATION: 98 % | BODY MASS INDEX: 26.84 KG/M2 | HEART RATE: 83 BPM | SYSTOLIC BLOOD PRESSURE: 128 MMHG | DIASTOLIC BLOOD PRESSURE: 79 MMHG | WEIGHT: 167 LBS | HEIGHT: 66 IN

## 2025-08-28 DIAGNOSIS — E85.4 CARDIAC AMYLOIDOSIS: ICD-10-CM

## 2025-08-28 DIAGNOSIS — I50.30 DIASTOLIC HEART FAILURE, STAGE C: ICD-10-CM

## 2025-08-28 DIAGNOSIS — E85.4 CARDIAC AMYLOIDOSIS: Primary | ICD-10-CM

## 2025-08-28 DIAGNOSIS — I43 CARDIAC AMYLOIDOSIS: ICD-10-CM

## 2025-08-28 DIAGNOSIS — I43 CARDIAC AMYLOIDOSIS: Primary | ICD-10-CM

## 2025-08-28 PROCEDURE — 99202 OFFICE O/P NEW SF 15 MIN: CPT

## 2025-08-28 PROCEDURE — 99212 OFFICE O/P EST SF 10 MIN: CPT

## 2025-10-20 ENCOUNTER — APPOINTMENT (OUTPATIENT)
Dept: PHARMACY | Facility: HOSPITAL | Age: 85
End: 2025-10-20
Payer: MEDICARE

## 2025-10-23 ENCOUNTER — APPOINTMENT (OUTPATIENT)
Dept: CARDIOLOGY | Facility: HOSPITAL | Age: 85
End: 2025-10-23
Payer: MEDICARE

## 2025-11-11 ENCOUNTER — APPOINTMENT (OUTPATIENT)
Dept: NEUROLOGY | Facility: CLINIC | Age: 85
End: 2025-11-11
Payer: MEDICARE

## 2025-11-14 ENCOUNTER — APPOINTMENT (OUTPATIENT)
Dept: SLEEP MEDICINE | Facility: CLINIC | Age: 85
End: 2025-11-14
Payer: MEDICARE

## 2025-11-19 ENCOUNTER — APPOINTMENT (OUTPATIENT)
Dept: CARDIOLOGY | Facility: CLINIC | Age: 85
End: 2025-11-19
Payer: MEDICARE

## 2026-01-15 ENCOUNTER — APPOINTMENT (OUTPATIENT)
Dept: PRIMARY CARE | Facility: CLINIC | Age: 86
End: 2026-01-15
Payer: MEDICARE

## 2026-02-24 ENCOUNTER — APPOINTMENT (OUTPATIENT)
Dept: DERMATOLOGY | Facility: CLINIC | Age: 86
End: 2026-02-24
Payer: MEDICARE